# Patient Record
Sex: FEMALE | Race: WHITE | NOT HISPANIC OR LATINO | Employment: OTHER | URBAN - METROPOLITAN AREA
[De-identification: names, ages, dates, MRNs, and addresses within clinical notes are randomized per-mention and may not be internally consistent; named-entity substitution may affect disease eponyms.]

---

## 2017-02-17 DIAGNOSIS — I10 HTN (HYPERTENSION): ICD-10-CM

## 2017-02-17 DIAGNOSIS — E78.5 HYPERLIPIDEMIA LDL GOAL <70: ICD-10-CM

## 2017-02-17 DIAGNOSIS — Z00.00 ROUTINE GENERAL MEDICAL EXAMINATION AT A HEALTH CARE FACILITY: ICD-10-CM

## 2017-02-17 RX ORDER — LISINOPRIL 10 MG/1
10 TABLET ORAL DAILY
Qty: 90 TABLET | Refills: 2 | Status: SHIPPED | OUTPATIENT
Start: 2017-02-17 | End: 2017-11-26

## 2017-02-17 NOTE — TELEPHONE ENCOUNTER
lisinopril (PRINIVIL,ZESTRIL) 10 MG tablet  Last Written Prescription Date:  1/11/16  Last Fill Quantity: 90,   # refills: 1  Last Office Visit with G, P or [facility] prescribing provider: 9/26/16  Future Office visit:   None  Potassium   Date Value Ref Range Status   06/29/2015 3.7 3.4 - 5.3 mmol/L Final   ]  Creatinine   Date Value Ref Range Status   06/29/2015 0.67 0.52 - 1.04 mg/dL Final   ]  BP Readings from Last 3 Encounters:   09/26/16 130/84   01/28/16 109/73   09/24/15 121/72       Routing refill request to provider for review/approval because:   lisinopril (PRINIVIL,ZESTRIL) 10 MG tablet. Labs past due.

## 2017-02-20 ENCOUNTER — TELEPHONE (OUTPATIENT)
Dept: DERMATOLOGY | Facility: CLINIC | Age: 72
End: 2017-02-20

## 2017-02-20 ENCOUNTER — OFFICE VISIT (OUTPATIENT)
Dept: DERMATOLOGY | Facility: CLINIC | Age: 72
End: 2017-02-20

## 2017-02-20 DIAGNOSIS — L71.9 ROSACEA: ICD-10-CM

## 2017-02-20 DIAGNOSIS — B00.9 HERPES VIRUS DISEASE: ICD-10-CM

## 2017-02-20 DIAGNOSIS — L24.9 IRRITANT DERMATITIS: ICD-10-CM

## 2017-02-20 DIAGNOSIS — Z12.83 SKIN CANCER SCREENING: Primary | ICD-10-CM

## 2017-02-20 RX ORDER — TRETINOIN 0.25 MG/G
CREAM TOPICAL
Qty: 45 G | Refills: 11 | Status: SHIPPED | OUTPATIENT
Start: 2017-02-20 | End: 2020-02-06

## 2017-02-20 RX ORDER — DOXYCYCLINE 100 MG/1
CAPSULE ORAL
Qty: 90 CAPSULE | Refills: 3 | Status: SHIPPED | OUTPATIENT
Start: 2017-02-20 | End: 2018-02-23

## 2017-02-20 RX ORDER — VALACYCLOVIR HYDROCHLORIDE 1 G/1
1000 TABLET, FILM COATED ORAL DAILY
Qty: 90 TABLET | Refills: 3 | Status: SHIPPED | OUTPATIENT
Start: 2017-02-20 | End: 2018-01-09

## 2017-02-20 RX ORDER — PIMECROLIMUS 10 MG/G
CREAM TOPICAL 2 TIMES DAILY
Qty: 60 G | Refills: 11 | Status: SHIPPED | OUTPATIENT
Start: 2017-02-20 | End: 2023-01-09

## 2017-02-20 ASSESSMENT — PAIN SCALES - GENERAL: PAINLEVEL: NO PAIN (0)

## 2017-02-20 NOTE — LETTER
2/20/2017       RE: Kassy Casper  [address] [place] [address]     Dear Colleague,    Thank you for referring your patient, Kassy Casper, to the [facility] DERMATOLOGY at [facility]. Please see a copy of my visit note below.    [facility] Dermatology Clinic Note    Dermatology Problem List:  1. Ocular rosacea  2. Acne rosacea s/p bactrim  - tretinoin 0.025% cream, doxycyline, Elidel 1% cream  3. History of irritant dermatitis  - desonide 0.05% ointment, triamcinolone 0.1% ointment  4. History of cold sores  - oral valacyclovir  5. Skin cancer screening 2/20/17    Encounter Date: Feb 20, 2017   Chief Complaint   Patient presents with     Skin Check     Kassy is requesting a full skin check, and needs med refills.     History of Present Illness:   This is a 71 year old female who presents to dermatology clinic for a follow up for a skin check. The patient was last seen on 2/19/16 by Dr. Lugo when she continued tretinoin cream, Elidel and started bactrim for rosacea, started desonide ointment and triamcinolone for irritant dermatitis and was prescribed oral valacyclovir if cold sores appear. Today the patient reports that when she was using her medication regimen, her skin was very good. She currently does not have any prescriptions for the medications. She also reports that she has a history of cold sores and they appear in random spots. For acne rosacea, she was using tretinoin 0.025% cream, Elidel and bactrim. She was switched to bactrim instead of doxycyline due to stomach problems, but her stomach problems did not resolve so it was not correlated. Her dermatitis has resolved and she does not deal with this very often, so she is not using the desonide or triamcinolone ointments. She reports that her eyes are not pruritic or red currently, but later in the day there will be irritation. The patient reports no other lesions of concern  at this time.     Otherwise, the patient reports no painful, bleeding, nonhealing, or pruritic lesions, and denies new or changing moles.    Past Medical History:   Allergies as of    Diagnosis     CARDIOVASCULAR SCREENING; LDL GOAL LESS THAN 100     Insomnia     CAD (coronary artery disease)     Hypertension     Hyperlipidemia with target LDL less than 70     Rosacea     Post-inflammatory hyperpigmentation     Lumbago     Acne     H/O cold sores     Family History:    The patient reports a strong family history of breast cancer. There is a family history of melanoma in two of her cousins and aunt.     Medications:   Current Outpatient Prescriptions   Medication Sig Dispense Refill     lisinopril (PRINIVIL/ZESTRIL) 10 MG tablet Take 1 tablet (10 mg) by mouth daily 90 tablet 2     ALPRAZolam (XANAX) 0.5 MG tablet Take 1 tablet (0.5 mg) by mouth nightly as needed for anxiety 30 tablet 0     ciprofloxacin (CIPRO) 500 MG tablet Take 1 tablet (500 mg) by mouth 2 times daily 10 tablet 0     pravastatin (PRAVACHOL) 80 MG tablet Take 1 tablet (80 mg) by mouth daily 90 tablet 2     traZODone (DESYREL) 100 MG tablet Take 1 tablet (100 mg) by mouth nightly as needed for sleep 90 tablet 3     hydrochlorothiazide (HYDRODIURIL) 50 MG tablet Take 1 tablet (50 mg) by mouth daily 90 tablet 3     valACYclovir (VALTREX) 1000 mg tablet Take 1 tablet (1,000 mg) by mouth daily 90 tablet 3     desonide (DESOWEN) 0.05 % ointment Apply topically 2 times daily To itchy rash and red areas of face until improved. Avoid eyes. 60 g 1     triamcinolone (KENALOG) 0.1 % ointment Apply topically 2 times daily To itchy and red areas of the hands as needed until improved. 60 g 5     clindamycin (CLEOCIN T) 1 % lotion Apply to entire face twice daily until improved. 60 mL 11     sulfamethoxazole-trimethoprim (BACTRIM DS) 800-160 MG per tablet Take 1 tablet by mouth 2 times daily 20 tablet 0     tretinoin (RETIN-A) 0.025 % cream Apply to entire face at  bedtime. Skip to every other night if too irritating. 45 g 11     predniSONE (DELTASONE) 20 MG tablet Take 1 tablet (20 mg) by mouth 2 times daily 20 tablet 1     acyclovir (ZOVIRAX) 5 % ointment Apply topically 5 times daily To affected area at lower lip until resolved. 30 g 11     doxycycline Monohydrate 100 MG CAPS Take 1 tab by mouth twice a day for two weeks. Then take 1 tab daily for 2 weeks until return to clinic. 60 capsule 11     pimecrolimus (ELIDEL) 1 % cream Apply topically 2 times daily To redness on the face 60 g 11     albuterol (PROAIR HFA, PROVENTIL HFA, VENTOLIN HFA) 108 (90 BASE) MCG/ACT inhaler Inhale 2 puffs into the lungs every 6 hours 1 Inhaler 3     VITAMIN D, CHOLECALCIFEROL, PO        fluticasone (FLONASE) 50 MCG/ACT nasal spray Spray 2 sprays into both nostrils daily 1 Package 3     loratadine (CLARITIN) 10 MG tablet Take 1 tablet (10 mg) by mouth daily as needed for allergies (for hives) 30 tablet 6     EPINEPHrine (EPIPEN) 0.3 MG/0.3ML injection Inject 0.3 mLs (0.3 mg) into the muscle once as needed for anaphylaxis 2 each 3     Multiple Vitamin (MULTIVITAMIN  S) CAPS Take 1 tablet by mouth daily.       aspirin 81 MG tablet Take 1 tablet by mouth daily.       Allergies:  Allergies as of 02/20/2017 - Dhaval as Reviewed 02/20/2017   Allergen Reaction Noted     Latex Other (See Comments) 12/17/2007     Minocycline hcl Swelling 10/07/2013     Penicillin [penicillins] Other (See Comments) 12/17/2007      Review of Systems:   Patient reports otherwise feeling well and denies any other skin concerns.    Physical exam:   Vital signs: There were no vitals taken for this visit.  GEN: Well appearing female in no acute distress alert and oriented to time, person, place, and situation.  SKIN: Total skin excluding the undergarment areas was performed. The exam included the head/face, neck, both arms, chest, back, abdomen, both legs, digits and/or nails.   - Few regular brown pigmented macules and papules  are identified on the trunk and extremities.   - There are bright red some shaped papules scattered on the trunk.   - Faint swelling of the eyelids with erythema noted to the lower conjunctiva.   - Scattered excoriated papules to her face.   - No other lesions of concern on areas examined.    Assessment and Plan:   1. Ocular rosacea  - Discussion of eye drops and the patient will talk to her ophthalmologist.   - Discussion that the doxycyline could help with this.     2. Acne rosacea  - Continue tretinoin 0.025% ointment - apply to entire face daily at bedtime. If too irritating or drying, decrease to every other night  - Start doxycyline 100 mg daily  - Continue pimecrolimus (elidel) 1% cream - apply topically bid to redness on the face    3. History of cold sores.   - Continue valacyclovir 1,000 mg twice daily by mouth for two weeks, then stop down to 1,000 mg daily by mouth for maintenance.    4. Few clinically benign nevi - trunk , extremities  - Benign nature was discussed. No further intervention required at this time.   - Monitor lesions for symptoms or change.     5. Cherry angioma(s) - trunk  - Benign nature was discussed. No further intervention required at this time.     Follow up in 1 year, earlier for new or changing lesions. .     Staff involved:  Scribe Disclosure:   I, Isabel Narayan, am serving as a scribe to document services personally performed by Brigette Portillo, based on data collection and the provider's statements to me.    Provider Disclosure:   The documentation recorded by the scribe accurately reflects the services I personally performed and the decisions made by me.    All risks, benefits and alternatives were discussed with patient.  Patient is in agreement and understands the assessment and plan.  All questions were answered.  Sun Screen Education was given.   Return to Clinic annually or sooner as needed.   Brigette Portillo PA-C   [facility] Dermatology [facility]

## 2017-02-20 NOTE — PROGRESS NOTES
[facility] Dermatology Clinic Note    Dermatology Problem List:  1. Ocular rosacea  2. Acne rosacea s/p bactrim  - tretinoin 0.025% cream, doxycyline, Elidel 1% cream  3. History of irritant dermatitis  - desonide 0.05% ointment, triamcinolone 0.1% ointment  4. History of cold sores  - oral valacyclovir  5. Skin cancer screening 2/20/17    Encounter Date: Feb 20, 2017   Chief Complaint   Patient presents with     Skin Check     Kassy is requesting a full skin check, and needs med refills.     History of Present Illness:   This is a 71 year old female who presents to dermatology clinic for a follow up for a skin check. The patient was last seen on 2/19/16 by Dr. Lugo when she continued tretinoin cream, Elidel and started bactrim for rosacea, started desonide ointment and triamcinolone for irritant dermatitis and was prescribed oral valacyclovir if cold sores appear. Today the patient reports that when she was using her medication regimen, her skin was very good. She currently does not have any prescriptions for the medications. She also reports that she has a history of cold sores and they appear in random spots. For acne rosacea, she was using tretinoin 0.025% cream, Elidel and bactrim. She was switched to bactrim instead of doxycyline due to stomach problems, but her stomach problems did not resolve so it was not correlated. Her dermatitis has resolved and she does not deal with this very often, so she is not using the desonide or triamcinolone ointments. She reports that her eyes are not pruritic or red currently, but later in the day there will be irritation. The patient reports no other lesions of concern at this time.     Otherwise, the patient reports no painful, bleeding, nonhealing, or pruritic lesions, and denies new or changing moles.    Past Medical History:   Allergies as of    Diagnosis     CARDIOVASCULAR SCREENING; LDL GOAL LESS THAN 100     Insomnia     CAD (coronary artery  disease)     Hypertension     Hyperlipidemia with target LDL less than 70     Rosacea     Post-inflammatory hyperpigmentation     Lumbago     Acne     H/O cold sores     Family History:    The patient reports a strong family history of breast cancer. There is a family history of melanoma in two of her cousins and aunt.     Medications:   Current Outpatient Prescriptions   Medication Sig Dispense Refill     lisinopril (PRINIVIL/ZESTRIL) 10 MG tablet Take 1 tablet (10 mg) by mouth daily 90 tablet 2     ALPRAZolam (XANAX) 0.5 MG tablet Take 1 tablet (0.5 mg) by mouth nightly as needed for anxiety 30 tablet 0     ciprofloxacin (CIPRO) 500 MG tablet Take 1 tablet (500 mg) by mouth 2 times daily 10 tablet 0     pravastatin (PRAVACHOL) 80 MG tablet Take 1 tablet (80 mg) by mouth daily 90 tablet 2     traZODone (DESYREL) 100 MG tablet Take 1 tablet (100 mg) by mouth nightly as needed for sleep 90 tablet 3     hydrochlorothiazide (HYDRODIURIL) 50 MG tablet Take 1 tablet (50 mg) by mouth daily 90 tablet 3     valACYclovir (VALTREX) 1000 mg tablet Take 1 tablet (1,000 mg) by mouth daily 90 tablet 3     desonide (DESOWEN) 0.05 % ointment Apply topically 2 times daily To itchy rash and red areas of face until improved. Avoid eyes. 60 g 1     triamcinolone (KENALOG) 0.1 % ointment Apply topically 2 times daily To itchy and red areas of the hands as needed until improved. 60 g 5     clindamycin (CLEOCIN T) 1 % lotion Apply to entire face twice daily until improved. 60 mL 11     sulfamethoxazole-trimethoprim (BACTRIM DS) 800-160 MG per tablet Take 1 tablet by mouth 2 times daily 20 tablet 0     tretinoin (RETIN-A) 0.025 % cream Apply to entire face at bedtime. Skip to every other night if too irritating. 45 g 11     predniSONE (DELTASONE) 20 MG tablet Take 1 tablet (20 mg) by mouth 2 times daily 20 tablet 1     acyclovir (ZOVIRAX) 5 % ointment Apply topically 5 times daily To affected area at lower lip until resolved. 30 g 11      doxycycline Monohydrate 100 MG CAPS Take 1 tab by mouth twice a day for two weeks. Then take 1 tab daily for 2 weeks until return to clinic. 60 capsule 11     pimecrolimus (ELIDEL) 1 % cream Apply topically 2 times daily To redness on the face 60 g 11     albuterol (PROAIR HFA, PROVENTIL HFA, VENTOLIN HFA) 108 (90 BASE) MCG/ACT inhaler Inhale 2 puffs into the lungs every 6 hours 1 Inhaler 3     VITAMIN D, CHOLECALCIFEROL, PO        fluticasone (FLONASE) 50 MCG/ACT nasal spray Spray 2 sprays into both nostrils daily 1 Package 3     loratadine (CLARITIN) 10 MG tablet Take 1 tablet (10 mg) by mouth daily as needed for allergies (for hives) 30 tablet 6     EPINEPHrine (EPIPEN) 0.3 MG/0.3ML injection Inject 0.3 mLs (0.3 mg) into the muscle once as needed for anaphylaxis 2 each 3     Multiple Vitamin (MULTIVITAMIN  S) CAPS Take 1 tablet by mouth daily.       aspirin 81 MG tablet Take 1 tablet by mouth daily.       Allergies:  Allergies as of 02/20/2017 - Dhaval as Reviewed 02/20/2017   Allergen Reaction Noted     Latex Other (See Comments) 12/17/2007     Minocycline hcl Swelling 10/07/2013     Penicillin [penicillins] Other (See Comments) 12/17/2007      Review of Systems:   Patient reports otherwise feeling well and denies any other skin concerns.    Physical exam:   Vital signs: There were no vitals taken for this visit.  GEN: Well appearing female in no acute distress alert and oriented to time, person, place, and situation.  SKIN: Total skin excluding the undergarment areas was performed. The exam included the head/face, neck, both arms, chest, back, abdomen, both legs, digits and/or nails.   - Few regular brown pigmented macules and papules are identified on the trunk and extremities.   - There are bright red some shaped papules scattered on the trunk.   - Faint swelling of the eyelids with erythema noted to the lower conjunctiva.   - Scattered excoriated papules to her face.   - No other lesions of concern on areas  examined.    Assessment and Plan:   1. Ocular rosacea  - Discussion of eye drops and the patient will talk to her ophthalmologist.   - Discussion that the doxycyline could help with this.     2. Acne rosacea  - Continue tretinoin 0.025% ointment - apply to entire face daily at bedtime. If too irritating or drying, decrease to every other night  - Start doxycyline 100 mg daily  - Continue pimecrolimus (elidel) 1% cream - apply topically bid to redness on the face    3. History of cold sores.   - Continue valacyclovir 1,000 mg twice daily by mouth for two weeks, then stop down to 1,000 mg daily by mouth for maintenance.    4. Few clinically benign nevi - trunk , extremities  - Benign nature was discussed. No further intervention required at this time.   - Monitor lesions for symptoms or change.     5. Cherry angioma(s) - trunk  - Benign nature was discussed. No further intervention required at this time.     Follow up in 1 year, earlier for new or changing lesions. .     Staff involved:  Scribe Disclosure:   I, Isabel Narayan, am serving as a scribe to document services personally performed by Brigette Portillo, based on data collection and the provider's statements to me.    Provider Disclosure:   The documentation recorded by the scribe accurately reflects the services I personally performed and the decisions made by me.    All risks, benefits and alternatives were discussed with patient.  Patient is in agreement and understands the assessment and plan.  All questions were answered.  Sun Screen Education was given.   Return to Clinic annually or sooner as needed.   Brigette Portillo PA-C   [facility]

## 2017-02-20 NOTE — NURSING NOTE
Dermatology Rooming Note    Kassy Casper's goals for this visit include:   Chief Complaint   Patient presents with     Skin Check     Kassy is requesting a full skin check, and needs med refills.       Melissa Mcgill LPN

## 2017-02-20 NOTE — MR AVS SNAPSHOT
After Visit Summary   2/20/2017    Kassy Casper    MRN: [id number]           Patient Information     Date Of Birth          1945        Visit Information        Provider Department      2/20/2017 11:00 AM Brigette Portillo PA-C [facility] Dermatology        Today's Diagnoses     Skin cancer screening    -  1    Herpes virus disease        Irritant dermatitis        Rosacea           Follow-ups after your visit        Follow-up notes from your care team     Return in about 1 year (around 2/20/2018).      Who to contact     Please call your clinic at [phone] to:    Ask questions about your health    Make or cancel appointments    Discuss your medicines    Learn about your test results    Speak to your doctor   If you have compliments or concerns about an experience at your clinic, or if you wish to file a complaint, please contact [facility] Patient Relations at [phone] or email us at [email or web address]         Additional Information About Your Visit        MyChart Information     [employer] gives you secure access to your electronic health record. If you see a primary care provider, you can also send messages to your care team and make appointments. If you have questions, please call your primary care clinic.  If you do not have a primary care provider, please call [phone] and they will assist you.      [employer] is an electronic gateway that provides easy, online access to your medical records. With [employer], you can request a clinic appointment, read your test results, renew a prescription or communicate with your care team.     To access your existing account, please contact your [facility] or call [phone] for assistance.        Care EveryWhere ID     This is your Care EveryWhere ID. This could be used by other organizations to access your [place] medical records  [id number]         Blood Pressure from  Last 3 Encounters:   09/26/16 130/84   01/28/16 109/73   09/24/15 121/72    Weight from Last 3 Encounters:   09/26/16 61.2 kg (135 lb)   01/28/16 60.2 kg (132 lb 12.8 oz)   09/24/15 58.4 kg (128 lb 11.2 oz)              Today, you had the following     No orders found for display         Today's Medication Changes          These changes are accurate as of: 2/20/17  7:00 PM.  If you have any questions, ask your nurse or doctor.               These medicines have changed or have updated prescriptions.        Dose/Directions    doxycycline Monohydrate 100 MG Caps   This may have changed:  additional instructions   Used for:  Rosacea   Changed by:  Brigette Portillo PA-C        Take 100 mg daily. Take with a full glass of water. Avoid taking immediately before bed.   Quantity:  90 capsule   Refills:  3            Where to get your medicines      These medications were sent to [place] [address] [place] [address] [address]     Phone:  [phone]     doxycycline Monohydrate 100 MG Caps    pimecrolimus 1 % cream    tretinoin 0.025 % cream    valACYclovir 1000 mg tablet                Primary Care Provider Office Phone # Fax #    Krunal Oconnell [phone]       [facility] [address] [place] [address]        Thank you!     Thank you for choosing [facility] DERMATOLOGY  for your care. Our goal is always to provide you with excellent care. Hearing back from our patients is one way we can continue to improve our services. Please take a few minutes to complete the written survey that you may receive in the mail after your visit with us. Thank you!             Your Updated Medication List - Protect others around you: Learn how to safely use, store and throw away your medicines at www.disposemymeds.org.          This list is accurate as of: 2/20/17  7:00 PM.  Always use your most recent med list.                   Brand  Name Dispense Instructions for use    acyclovir 5 % ointment    ZOVIRAX    30 g    Apply topically 5 times daily To affected area at lower lip until resolved.       albuterol 108 (90 BASE) MCG/ACT Inhaler    PROAIR HFA/PROVENTIL HFA/VENTOLIN HFA    1 Inhaler    Inhale 2 puffs into the lungs every 6 hours       ALPRAZolam 0.5 MG tablet    XANAX    30 tablet    Take 1 tablet (0.5 mg) by mouth nightly as needed for anxiety       aspirin 81 MG tablet      Take 1 tablet by mouth daily.       ciprofloxacin 500 MG tablet    CIPRO    10 tablet    Take 1 tablet (500 mg) by mouth 2 times daily       clindamycin 1 % lotion    CLEOCIN T    60 mL    Apply to entire face twice daily until improved.       desonide 0.05 % ointment    DESOWEN    60 g    Apply topically 2 times daily To itchy rash and red areas of face until improved. Avoid eyes.       doxycycline Monohydrate 100 MG Caps     90 capsule    Take 100 mg daily. Take with a full glass of water. Avoid taking immediately before bed.       EPINEPHrine 0.3 MG/0.3ML injection    EPIPEN    2 each    Inject 0.3 mLs (0.3 mg) into the muscle once as needed for anaphylaxis       fluticasone 50 MCG/ACT spray    FLONASE    1 Package    Spray 2 sprays into both nostrils daily       hydrochlorothiazide 50 MG tablet    HYDRODIURIL    90 tablet    Take 1 tablet (50 mg) by mouth daily       lisinopril 10 MG tablet    PRINIVIL/ZESTRIL    90 tablet    Take 1 tablet (10 mg) by mouth daily       loratadine 10 MG tablet    CLARITIN    30 tablet    Take 1 tablet (10 mg) by mouth daily as needed for allergies (for hives)       MULTIvitamin  S Caps      Take 1 tablet by mouth daily.       pimecrolimus 1 % cream    ELIDEL    60 g    Apply topically 2 times daily To redness on the face       pravastatin 80 MG tablet    PRAVACHOL    90 tablet    Take 1 tablet (80 mg) by mouth daily       predniSONE 20 MG tablet    DELTASONE    20 tablet    Take 1 tablet (20 mg) by mouth 2 times daily        sulfamethoxazole-trimethoprim 800-160 MG per tablet    BACTRIM DS    20 tablet    Take 1 tablet by mouth 2 times daily       traZODone 100 MG tablet    DESYREL    90 tablet    Take 1 tablet (100 mg) by mouth nightly as needed for sleep       tretinoin 0.025 % cream    RETIN-A    45 g    Apply to entire face at bedtime. Skip to every other night if too irritating.       triamcinolone 0.1 % ointment    KENALOG    60 g    Apply topically 2 times daily To itchy and red areas of the hands as needed until improved.       valACYclovir 1000 mg tablet    VALTREX    90 tablet    Take 1 tablet (1,000 mg) by mouth daily       VITAMIN D (CHOLECALCIFEROL) PO

## 2017-03-28 DIAGNOSIS — R30.0 DYSURIA: ICD-10-CM

## 2017-03-28 RX ORDER — CIPROFLOXACIN 500 MG/1
500 TABLET, FILM COATED ORAL 2 TIMES DAILY
Qty: 10 TABLET | Refills: 0 | Status: SHIPPED | OUTPATIENT
Start: 2017-03-28 | End: 2020-02-07

## 2017-03-28 NOTE — TELEPHONE ENCOUNTER
ciprofloxacin (CIPRO) 500 MG tablet    Last Written Prescription Date:  12/19/16  Last Fill Quantity: 10,   # refills: 0  Last Office Visit with G, P or [facility] prescribing provider: 9/26/16  Future Office visit:   none    Routing refill request to provider for review/approval because:   ciprofloxacin (CIPRO) 500 MG tablet. Not on [facility] protocol. rf?

## 2017-04-20 DIAGNOSIS — F41.9 ANXIETY: ICD-10-CM

## 2017-04-20 NOTE — TELEPHONE ENCOUNTER
Alprazolam 0.5 mg tabs      Last Written Prescription Date:  1-19-17  Last Fill Quantity: 30,   # refills: 0  Last Office Visit : 9-26-16  Future Office visit:  none    Routing refill request to provider for review/approval because:  Drug not on the [facility], [facility] or [facility] refill protocol or controlled substance.      Kathleen M Doege RN

## 2017-04-25 RX ORDER — ALPRAZOLAM 0.5 MG
0.5 TABLET ORAL
Qty: 30 TABLET | Refills: 0
Start: 2017-04-25 | End: 2017-06-29

## 2017-06-29 DIAGNOSIS — F41.9 ANXIETY: ICD-10-CM

## 2017-06-29 RX ORDER — ALPRAZOLAM 0.5 MG
0.5 TABLET ORAL
Qty: 30 TABLET | Refills: 0
Start: 2017-06-29 | End: 2017-09-07

## 2017-08-15 DIAGNOSIS — E78.5 HYPERLIPIDEMIA LDL GOAL <70: ICD-10-CM

## 2017-08-15 DIAGNOSIS — I10 ESSENTIAL HYPERTENSION: ICD-10-CM

## 2017-08-15 DIAGNOSIS — Z00.00 ROUTINE GENERAL MEDICAL EXAMINATION AT A HEALTH CARE FACILITY: ICD-10-CM

## 2017-08-16 RX ORDER — PRAVASTATIN SODIUM 80 MG/1
80 TABLET ORAL DAILY
Qty: 90 TABLET | Refills: 0 | Status: SHIPPED | OUTPATIENT
Start: 2017-08-16 | End: 2017-11-26

## 2017-08-16 NOTE — TELEPHONE ENCOUNTER
pravastatin (PRAVACHOL) 80 MG tablet      Last Written Prescription Date:  11-23-16  Last Fill Quantity: 90,   # refills: 2  Last Office Visit : 9-26-16  Future Office visit:  none    Kathleen M Doege RN

## 2017-09-07 DIAGNOSIS — F41.9 ANXIETY: ICD-10-CM

## 2017-09-07 RX ORDER — ALPRAZOLAM 0.5 MG
0.5 TABLET ORAL
Qty: 30 TABLET | Refills: 0
Start: 2017-09-07 | End: 2018-02-01

## 2017-10-22 ENCOUNTER — HEALTH MAINTENANCE LETTER (OUTPATIENT)
Age: 72
End: 2017-10-22

## 2017-11-26 DIAGNOSIS — Z00.00 ROUTINE GENERAL MEDICAL EXAMINATION AT A HEALTH CARE FACILITY: ICD-10-CM

## 2017-11-26 DIAGNOSIS — I10 ESSENTIAL HYPERTENSION: ICD-10-CM

## 2017-11-26 DIAGNOSIS — E78.5 HYPERLIPIDEMIA LDL GOAL <70: ICD-10-CM

## 2017-11-26 DIAGNOSIS — I10 HTN (HYPERTENSION): ICD-10-CM

## 2017-11-26 NOTE — LETTER
November 29, 2017      TO: Kassy Casper  [address] [place] [address]     Dear Ms. Kassy Casper,    This letter is a reminder that you are overdue to see your Primary Care Provider for an Annual Visit and needed labs. You must be seen by your Primary Care Provider on a yearly basis and have appropriate labs drawn for continued care and prescription refills. Please call [phone] to schedule an appointment for an Annual Visit with RADHA CORNELIUS MD.   LAST VISIT  9/26/16    You have been given a 30 day supply/refill of your pravastatin (PRAVACHOL) 80 MG &  lisinopril (PRINIVIL/ZESTRIL) 10 MG while you get your clinic visit/labs completed.     [facility],  Primary Care Center

## 2017-11-29 ENCOUNTER — OFFICE VISIT (OUTPATIENT)
Dept: OPHTHALMOLOGY | Facility: CLINIC | Age: 72
End: 2017-11-29
Attending: OPHTHALMOLOGY
Payer: MEDICARE

## 2017-11-29 DIAGNOSIS — H25.813 COMBINED FORMS OF AGE-RELATED CATARACT OF BOTH EYES: Primary | ICD-10-CM

## 2017-11-29 DIAGNOSIS — H01.119 CONTACT AND ALLERGIC DERMATITIS OF EYELID: ICD-10-CM

## 2017-11-29 DIAGNOSIS — D31.32 NEVUS OF CHOROID OF LEFT EYE: ICD-10-CM

## 2017-11-29 DIAGNOSIS — H52.4 PRESBYOPIA: ICD-10-CM

## 2017-11-29 PROCEDURE — 99213 OFFICE O/P EST LOW 20 MIN: CPT | Mod: ZF

## 2017-11-29 PROCEDURE — 92015 DETERMINE REFRACTIVE STATE: CPT | Mod: GY,ZF

## 2017-11-29 RX ORDER — LISINOPRIL 10 MG/1
10 TABLET ORAL DAILY
Qty: 30 TABLET | Refills: 0 | Status: SHIPPED | OUTPATIENT
Start: 2017-11-29 | End: 2017-12-31

## 2017-11-29 RX ORDER — PRAVASTATIN SODIUM 80 MG/1
80 TABLET ORAL DAILY
Qty: 30 TABLET | Refills: 0 | Status: SHIPPED | OUTPATIENT
Start: 2017-11-29 | End: 2017-12-31

## 2017-11-29 RX ORDER — NEOMYCIN SULFATE, POLYMYXIN B SULFATE, AND DEXAMETHASONE 3.5; 10000; 1 MG/G; [USP'U]/G; MG/G
1 OINTMENT OPHTHALMIC 2 TIMES DAILY
Qty: 1 TUBE | Refills: 1 | Status: SHIPPED | OUTPATIENT
Start: 2017-11-29 | End: 2020-02-07

## 2017-11-29 ASSESSMENT — TONOMETRY
OS_IOP_MMHG: 17
IOP_METHOD: TONOPEN
OD_IOP_MMHG: 16

## 2017-11-29 ASSESSMENT — SLIT LAMP EXAM - LIDS: COMMENTS: NORMAL

## 2017-11-29 ASSESSMENT — CUP TO DISC RATIO
OS_RATIO: 0.3
OD_RATIO: 0.3

## 2017-11-29 ASSESSMENT — REFRACTION_MANIFEST
OD_ADD: +2.00
OD_CYLINDER: +1.00
OD_SPHERE: -0.25
OS_ADD: +2.00
OS_CYLINDER: +0.50
OS_SPHERE: -1.00
OD_AXIS: 165
OS_AXIS: 010

## 2017-11-29 ASSESSMENT — VISUAL ACUITY
METHOD: SNELLEN - LINEAR
OS_SC: 20/25
OD_SC: 20/20

## 2017-11-29 ASSESSMENT — EXTERNAL EXAM - LEFT EYE: OS_EXAM: NORMAL

## 2017-11-29 ASSESSMENT — CONF VISUAL FIELD
OS_NORMAL: 1
OD_NORMAL: 1

## 2017-11-29 ASSESSMENT — EXTERNAL EXAM - RIGHT EYE: OD_EXAM: ROSACEA

## 2017-11-29 NOTE — PROGRESS NOTES
HPI  Kassy Casper is a 71 year old female presenting for comprehensive eye exam. Reports that for the past 2.5 months she has had redness and itchiness underneath the right eye lid. Reports that it changes color.    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 Related to her history of allergy/asthma, possible eczema  Plan: Try topical maxitrol to lower eyelid. Recommend she follow-up in dermatology clinic.     (H52.4) Presbyopia  Comment: Refracted and corrects to 20/20  Plan: Ok to continue with OTC readers     -----------------------------------------------------------------------------------    Patient disposition:   Return in about 1 year (around 11/29/2018). or sooner as needed.    Mikel Juarez MD  PGY-2 Ophthalmology  [phone]    Teaching statement:  Complete documentation of historical and exam elements from today's encounter can be found in the full encounter summary report (not reduplicated in this progress note). I personally obtained the chief complaint(s) and history of present illness.  I confirmed and edited as necessary the review of systems, past medical/surgical history, family history, social history, and examination findings as documented by others; and I examined the patient myself. I personally reviewed the relevant tests, images, and reports as documented above.     I formulated and edited as necessary the assessment and plan and discussed the findings and management plan with the patient and family.    Lynette Castaneda MD  Comprehensive Ophthalmology & Ocular Pathology  Department of  Ophthalmology and Visual Neurosciences  [email or web address]  Pager [phone]

## 2017-11-29 NOTE — MR AVS SNAPSHOT
After Visit Summary   11/29/2017    Kassy Casper    MRN: [id number]           Patient Information     Date Of Birth          1945        Visit Information        Provider Department      11/29/2017 7:45 AM Lynette Castaneda MD [facility]        Today's Diagnoses     Combined forms of age-related cataract of both eyes    -  1    Nevus of choroid of left eye        Contact and allergic dermatitis of eyelid        Presbyopia           Follow-ups after your visit        Follow-up notes from your care team     Return in about 1 year (around 11/29/2018).      Who to contact     Please call your clinic at [phone] to:    Ask questions about your health    Make or cancel appointments    Discuss your medicines    Learn about your test results    Speak to your doctor   If you have compliments or concerns about an experience at your clinic, or if you wish to file a complaint, please contact [facility] Patient Relations at [phone] or email us at [email or web address]         Additional Information About Your Visit        MyChart Information     [employer] gives you secure access to your electronic health record. If you see a primary care provider, you can also send messages to your care team and make appointments. If you have questions, please call your primary care clinic.  If you do not have a primary care provider, please call [phone] and they will assist you.      [employer] is an electronic gateway that provides easy, online access to your medical records. With [employer], you can request a clinic appointment, read your test results, renew a prescription or communicate with your care team.     To access your existing account, please contact your [facility] or call [phone] for assistance.        Care EveryWhere ID     This is your Care EveryWhere ID. This could be used by other organizations to access your [facility]  records  [id number]         Blood Pressure from Last 3 Encounters:   09/26/16 130/84   01/28/16 109/73   09/24/15 121/72    Weight from Last 3 Encounters:   09/26/16 61.2 kg (135 lb)   01/28/16 60.2 kg (132 lb 12.8 oz)   09/24/15 58.4 kg (128 lb 11.2 oz)              Today, you had the following     No orders found for display         Today's Medication Changes          These changes are accurate as of: 11/29/17  9:40 AM.  If you have any questions, ask your nurse or doctor.               Start taking these medicines.        Dose/Directions    neomycin-polymyxin-dexamethasone 3.5-49521-0.1 Oint ophthalmic ointment   Commonly known as:  MAXITROL   Used for:  Contact and allergic dermatitis of eyelid   Started by:  Lynette Castaneda MD        Dose:  1 Application   Place 1 Application into the right eye 2 times daily   Quantity:  1 Tube   Refills:  1            Where to get your medicines      These medications were sent to [place] [address] [place] [address]     Phone:  [phone]     neomycin-polymyxin-dexamethasone 3.5-31134-5.1 Oint ophthalmic ointment                Primary Care Provider Office Phone # Fax #    Krunal Oconnell [phone]       0 [address] [address]        Equal Access to Services     [facility] AH: Hadii james Graham, waaxda lujuanaadaha, qaybta kaalmada sarah, waxvarinder yonny mcneil. So [facility] [phone].    ATENCIÓN: Si habla español, tiene a hale disposición servicios gratuitos de asistencia lingüística. Javier jolly [phone].    We comply with applicable federal civil rights laws and [place] laws. We do not discriminate on the basis of race, color, national origin, age, disability, sex, sexual orientation, or gender identity.            Thank you!     Thank you for choosing [facility]  for your care. Our goal is always to provide you with excellent  care. Hearing back from our patients is one way we can continue to improve our services. Please take a few minutes to complete the written survey that you may receive in the mail after your visit with us. Thank you!             Your Updated Medication List - Protect others around you: Learn how to safely use, store and throw away your medicines at www.disposemymeds.org.          This list is accurate as of: 11/29/17  9:40 AM.  Always use your most recent med list.                   Brand Name Dispense Instructions for use Diagnosis    acyclovir 5 % ointment    ZOVIRAX    30 g    Apply topically 5 times daily To affected area at lower lip until resolved.    Cold sore       albuterol 108 (90 BASE) MCG/ACT Inhaler    PROAIR HFA/PROVENTIL HFA/VENTOLIN HFA    1 Inhaler    Inhale 2 puffs into the lungs every 6 hours    Intermittent asthma       ALPRAZolam 0.5 MG tablet    XANAX    30 tablet    Take 1 tablet (0.5 mg) by mouth nightly as needed for anxiety . Patient needs to see primary provider for further refills.    Anxiety       aspirin 81 MG tablet      Take 1 tablet by mouth daily.        ciprofloxacin 500 MG tablet    CIPRO    10 tablet    Take 1 tablet (500 mg) by mouth 2 times daily    Dysuria       clindamycin 1 % lotion    CLEOCIN T    60 mL    Apply to entire face twice daily until improved.    Irritant dermatitis       desonide 0.05 % ointment    DESOWEN    60 g    Apply topically 2 times daily To itchy rash and red areas of face until improved. Avoid eyes.    Irritant dermatitis       doxycycline monohydrate 100 MG capsule     90 capsule    Take 100 mg daily. Take with a full glass of water. Avoid taking immediately before bed.    Rosacea       EPINEPHrine 0.3 MG/0.3ML injection    EPIPEN    2 each    Inject 0.3 mLs (0.3 mg) into the muscle once as needed for anaphylaxis    Urticaria, unspecified       fluticasone 50 MCG/ACT spray    FLONASE    1 Package    Spray 2 sprays into both nostrils daily    Acute  sinusitis with symptoms > 10 days       hydrochlorothiazide 50 MG tablet    HYDRODIURIL    90 tablet    Take 1 tablet (50 mg) by mouth daily    Essential hypertension with goal blood pressure less than 140/90, Hyperlipidemia LDL goal <70, Routine general medical examination at a health care facility       lisinopril 10 MG tablet    PRINIVIL/ZESTRIL    90 tablet    Take 1 tablet (10 mg) by mouth daily    HTN (hypertension), Hyperlipidemia LDL goal <70, Routine general medical examination at a health care facility       loratadine 10 MG tablet    CLARITIN    30 tablet    Take 1 tablet (10 mg) by mouth daily as needed for allergies (for hives)    Urticaria, unspecified       MULTIvitamin  S Caps      Take 1 tablet by mouth daily.        neomycin-polymyxin-dexamethasone 3.5-72857-9.1 Oint ophthalmic ointment    MAXITROL    1 Tube    Place 1 Application into the right eye 2 times daily    Contact and allergic dermatitis of eyelid       pimecrolimus 1 % cream    ELIDEL    60 g    Apply topically 2 times daily To redness on the face    Rosacea       pravastatin 80 MG tablet    PRAVACHOL    90 tablet    Take 1 tablet (80 mg) by mouth daily    Hyperlipidemia LDL goal <70, Essential hypertension, Routine general medical examination at a health care facility       predniSONE 20 MG tablet    DELTASONE    20 tablet    Take 1 tablet (20 mg) by mouth 2 times daily    Urticaria, unspecified       sulfamethoxazole-trimethoprim 800-160 MG per tablet    BACTRIM DS    20 tablet    Take 1 tablet by mouth 2 times daily    Irritant dermatitis       traZODone 100 MG tablet    DESYREL    90 tablet    Take 1 tablet (100 mg) by mouth nightly as needed for sleep    Other insomnia       tretinoin 0.025 % cream    RETIN-A    45 g    Apply to entire face at bedtime. Skip to every other night if too irritating.    Rosacea       triamcinolone 0.1 % ointment    KENALOG    60 g    Apply topically 2 times daily To itchy and red areas of the hands as  needed until improved.    Irritant dermatitis       valACYclovir 1000 mg tablet    VALTREX    90 tablet    Take 1 tablet (1,000 mg) by mouth daily    Herpes virus disease       VITAMIN D (CHOLECALCIFEROL) PO

## 2017-11-29 NOTE — TELEPHONE ENCOUNTER
pravastatin       Last Written Prescription Date:  8/16/17  Last Fill Quantity: 90,   # refills: 0  Last Office Visit : 9/26/16  Future Office visit:  NONE    lisinopril    Last Written Prescription Date:  2/17/17  Last Fill Quantity: 90,   # refills: 2       Routing refill request for review/approval because: OVER DUE MD APPT.  LETTER + 30 DAY RF

## 2017-11-29 NOTE — NURSING NOTE
Chief Complaints and History of Present Illnesses   Patient presents with     COMPREHENSIVE EYE EXAM     HPI    Affected eye(s):  Both   Symptoms:     No blurred vision   No decreased vision   No distorted vision   No floaters   No flashes      Frequency:  Constant       Do you have eye pain now?:  No      Comments:  Pt stated vision has been stable over the last 20 months OU.  Mik Licona  8:14 AM November 29, 2017

## 2017-12-11 DIAGNOSIS — I10 ESSENTIAL HYPERTENSION WITH GOAL BLOOD PRESSURE LESS THAN 140/90: ICD-10-CM

## 2017-12-11 DIAGNOSIS — E78.5 HYPERLIPIDEMIA LDL GOAL <70: ICD-10-CM

## 2017-12-11 DIAGNOSIS — Z00.00 ROUTINE GENERAL MEDICAL EXAMINATION AT A HEALTH CARE FACILITY: ICD-10-CM

## 2017-12-11 NOTE — LETTER
Kassy Casper  [address] [place] [address]        December 13, 2017      This letter is a reminder that you are overdue to see your Primary Care Provider for an Annual Visit and needed labs. You must be seen by your Primary Care Provider on a yearly basis and have appropriate labs drawn for continued care and prescription refills. Please call [phone] to schedule an appointment for an Annual Visit with Dr Krunal Chauhan MD.       You have been given a 30 day supply/refill of your HCTZ while you get your clinic visit/labs completed.      [facility],    Primary Care Center

## 2017-12-13 RX ORDER — HYDROCHLOROTHIAZIDE 50 MG/1
50 TABLET ORAL DAILY
Qty: 30 TABLET | Refills: 0 | Status: SHIPPED | OUTPATIENT
Start: 2017-12-13 | End: 2018-01-19

## 2017-12-13 NOTE — TELEPHONE ENCOUNTER
HCTZ 50 mg tabs      Last Written Prescription Date:  9-26-16  Last Fill Quantity: 90,   # refills: 3  Last Office Visit : 9-26-16  Future Office visit:  none    Will refill for 30 day dany and send My Chart message informing pt she needs to make an appointment - she has been sent a letter in the mail already.      Kathleen M Doege RN

## 2017-12-31 DIAGNOSIS — I10 HTN (HYPERTENSION): ICD-10-CM

## 2017-12-31 DIAGNOSIS — Z00.00 ROUTINE GENERAL MEDICAL EXAMINATION AT A HEALTH CARE FACILITY: ICD-10-CM

## 2017-12-31 DIAGNOSIS — E78.5 HYPERLIPIDEMIA LDL GOAL <70: ICD-10-CM

## 2017-12-31 DIAGNOSIS — I10 ESSENTIAL HYPERTENSION: ICD-10-CM

## 2018-01-03 NOTE — TELEPHONE ENCOUNTER
lisinopril    Last Written Prescription Date: 11/29/17  Last Fill Quantity: 30, # refills: 0  pravastatin      Last Written Prescription Date: 11/29/17  Last Fill Quantity: 30, # refills: 0  Last Office Visit with [facility], P or [facility] prescribing provider: 9/26/16       Potassium   Date Value Ref Range Status   06/29/2015 3.7 3.4 - 5.3 mmol/L Final     Creatinine   Date Value Ref Range Status   06/29/2015 0.67 0.52 - 1.04 mg/dL Final     BP Readings from Last 3 Encounters:   09/26/16 130/84   01/28/16 109/73   09/24/15 121/72      routed to clinic RN, appt and labs overdue, scheduling letter sent in December

## 2018-01-04 ENCOUNTER — MYC MEDICAL ADVICE (OUTPATIENT)
Dept: INTERNAL MEDICINE | Facility: CLINIC | Age: 73
End: 2018-01-04

## 2018-01-04 RX ORDER — LISINOPRIL 10 MG/1
10 TABLET ORAL DAILY
Qty: 15 TABLET | Refills: 0 | Status: SHIPPED | OUTPATIENT
Start: 2018-01-04 | End: 2018-01-19

## 2018-01-04 RX ORDER — PRAVASTATIN SODIUM 80 MG/1
80 TABLET ORAL DAILY
Qty: 15 TABLET | Refills: 0 | Status: SHIPPED | OUTPATIENT
Start: 2018-01-04 | End: 2018-01-19

## 2018-01-08 DIAGNOSIS — E78.5 HYPERLIPIDEMIA LDL GOAL <70: ICD-10-CM

## 2018-01-08 DIAGNOSIS — I10 ESSENTIAL HYPERTENSION: ICD-10-CM

## 2018-01-08 DIAGNOSIS — Z00.00 ROUTINE GENERAL MEDICAL EXAMINATION AT A HEALTH CARE FACILITY: ICD-10-CM

## 2018-01-09 DIAGNOSIS — L24.9 IRRITANT DERMATITIS: ICD-10-CM

## 2018-01-09 DIAGNOSIS — B00.9 HERPES VIRUS DISEASE: ICD-10-CM

## 2018-01-09 NOTE — TELEPHONE ENCOUNTER
Last visit 2-20-17  Recall placed today    Assessment and Plan:   1. Ocular rosacea  - Discussion of eye drops and the patient will talk to her ophthalmologist.   - Discussion that the doxycyline could help with this.      2. Acne rosacea  - Continue tretinoin 0.025% ointment - apply to entire face daily at bedtime. If too irritating or drying, decrease to every other night  - Start doxycyline 100 mg daily  - Continue pimecrolimus (elidel) 1% cream - apply topically bid to redness on the face     3. History of cold sores.   - Continue valacyclovir 1,000 mg twice daily by mouth for two weeks, then stop down to 1,000 mg daily by mouth for maintenance.     4. Few clinically benign nevi - trunk , extremities  - Benign nature was discussed. No further intervention required at this time.   - Monitor lesions for symptoms or change.      5. Cherry angioma(s) - trunk  - Benign nature was discussed. No further intervention required at this time.      Follow up in 1 year, earlier for new or changing lesions.

## 2018-01-10 RX ORDER — PRAVASTATIN SODIUM 80 MG/1
80 TABLET ORAL DAILY
Qty: 30 TABLET | Refills: 0 | OUTPATIENT
Start: 2018-01-10

## 2018-01-10 RX ORDER — VALACYCLOVIR HYDROCHLORIDE 1 G/1
1000 TABLET, FILM COATED ORAL DAILY
Qty: 90 TABLET | Refills: 3 | Status: SHIPPED | OUTPATIENT
Start: 2018-01-10 | End: 2020-02-07

## 2018-01-10 NOTE — TELEPHONE ENCOUNTER
pravastatin (PRAVACHOL)   Last Written Prescription Date:  1/4/18  Last Fill Quantity: 15,   # refills: 0  Last Office Visit : 9/26/16  Future Office visit: none    Scheduling has been notified to contact the pt for appointment.

## 2018-01-19 ENCOUNTER — OFFICE VISIT (OUTPATIENT)
Dept: FAMILY MEDICINE | Facility: CLINIC | Age: 73
End: 2018-01-19
Payer: MEDICARE

## 2018-01-19 VITALS
HEIGHT: 62 IN | SYSTOLIC BLOOD PRESSURE: 155 MMHG | WEIGHT: 135.6 LBS | HEART RATE: 81 BPM | RESPIRATION RATE: 16 BRPM | BODY MASS INDEX: 24.95 KG/M2 | DIASTOLIC BLOOD PRESSURE: 93 MMHG

## 2018-01-19 DIAGNOSIS — I10 ESSENTIAL HYPERTENSION WITH GOAL BLOOD PRESSURE LESS THAN 140/90: ICD-10-CM

## 2018-01-19 DIAGNOSIS — I10 ESSENTIAL HYPERTENSION: ICD-10-CM

## 2018-01-19 DIAGNOSIS — Z12.31 ENCOUNTER FOR SCREENING MAMMOGRAM FOR MALIGNANT NEOPLASM OF BREAST: ICD-10-CM

## 2018-01-19 DIAGNOSIS — F41.9 ANXIETY: ICD-10-CM

## 2018-01-19 DIAGNOSIS — I10 HYPERTENSION, UNSPECIFIED TYPE: ICD-10-CM

## 2018-01-19 DIAGNOSIS — Z00.00 ROUTINE GENERAL MEDICAL EXAMINATION AT A HEALTH CARE FACILITY: ICD-10-CM

## 2018-01-19 DIAGNOSIS — E78.5 HYPERLIPIDEMIA LDL GOAL <70: ICD-10-CM

## 2018-01-19 RX ORDER — LISINOPRIL 10 MG/1
10 TABLET ORAL DAILY
Qty: 90 TABLET | Refills: 3 | Status: SHIPPED | OUTPATIENT
Start: 2018-01-19 | End: 2018-12-08

## 2018-01-19 RX ORDER — HYDROCHLOROTHIAZIDE 50 MG/1
50 TABLET ORAL DAILY
Qty: 90 TABLET | Refills: 3 | Status: SHIPPED | OUTPATIENT
Start: 2018-01-19 | End: 2019-01-15

## 2018-01-19 RX ORDER — PRAVASTATIN SODIUM 80 MG/1
80 TABLET ORAL DAILY
Qty: 90 TABLET | Refills: 3 | Status: SHIPPED | OUTPATIENT
Start: 2018-01-19 | End: 2019-01-15

## 2018-01-19 ASSESSMENT — PAIN SCALES - GENERAL: PAINLEVEL: NO PAIN (0)

## 2018-01-19 NOTE — PROGRESS NOTES
Blood Pressure   BP Readings from Last 1 Encounters:   01/19/18 155/81    Single BP recheck started, 3:23 PM (4 minutes)

## 2018-01-19 NOTE — PATIENT INSTRUCTIONS
Primary Care Center Phone Number [phone]  Primary Care Center Medication Refill Request Information:  * Please contact your pharmacy regarding ANY request for medication refills.  ** [facility] Prescription Fax = [phone]  * Please allow 3 business days for routine medication refills.  * Please allow 5 business days for controlled substance medication refills.     Primary Care Center Test Result notification information:  *You will be notified with in 7-10 days of your appointment day regarding the results of your test.  If you are on MyChart you will be notified as soon as the provider has reviewed the results and signed off on them.      Please schedule the following appointments:  Mammogram:  Breast Center ([facility]) [phone] ([facility] 2nd [facility])  Breast Center ([facility]) [phone] ([address])

## 2018-01-19 NOTE — NURSING NOTE
Chief Complaint   Patient presents with     Recheck Medication     pt is here for a medication follow up       Radha Erwin CMA at 3:21 PM on 1/19/2018

## 2018-01-19 NOTE — MR AVS SNAPSHOT
After Visit Summary   1/19/2018    Kassy Casper    MRN: [id number]           Patient Information     Date Of Birth          1945        Visit Information        Provider Department      1/19/2018 3:35 PM Krunal Oconnell MD [facility]        Today's Diagnoses     Hypertension, unspecified type        Hyperlipidemia LDL goal <70        Routine general medical examination at a health care facility        Essential hypertension        Essential hypertension with goal blood pressure less than 140/90        Anxiety        Encounter for screening mammogram for malignant neoplasm of breast           Care Instructions    Primary Care Center Phone Number [phone]  Primary Care Center Medication Refill Request Information:  * Please contact your pharmacy regarding ANY request for medication refills.  ** [facility] Prescription Fax = [phone]  * Please allow 3 business days for routine medication refills.  * Please allow 5 business days for controlled substance medication refills.     Primary Care Center Test Result notification information:  *You will be notified with in 7-10 days of your appointment day regarding the results of your test.  If you are on MyChart you will be notified as soon as the provider has reviewed the results and signed off on them.      Please schedule the following appointments:  Mammogram:  Breast Center ([facility]) [phone] ([facility] 2nd Floor)  Breast Center ([facility]) [phone] ([address])             Follow-ups after your visit        Follow-up notes from your care team     Return in about 1 year (around 1/19/2019).      Future tests that were ordered for you today     Open Future Orders        Priority Expected Expires Ordered    Lipid panel reflex to direct LDL Fasting Routine 1/19/2018 2/2/2018 1/19/2018    Comprehensive metabolic panel Routine 1/19/2018 2/2/2018 1/19/2018    Mammogram, routine screening Routine   "1/19/2019 1/19/2018            Who to contact     Please call your clinic at [phone] to:    Ask questions about your health    Make or cancel appointments    Discuss your medicines    Learn about your test results    Speak to your doctor   If you have compliments or concerns about an experience at your clinic, or if you wish to file a complaint, please contact [facility] Patient Relations at [phone] or email us at [email or web address]         Additional Information About Your Visit        [employer] Information     [employer] gives you secure access to your electronic health record. If you see a primary care provider, you can also send messages to your care team and make appointments. If you have questions, please call your primary care clinic.  If you do not have a primary care provider, please call [phone] and they will assist you.      [employer] is an electronic gateway that provides easy, online access to your medical records. With [employer], you can request a clinic appointment, read your test results, renew a prescription or communicate with your care team.     To access your existing account, please contact your [facility] or call [phone] for assistance.        Care EveryWhere ID     This is your Care EveryWhere ID. This could be used by other organizations to access your [facility] medical records  [id number]        Your Vitals Were     Pulse Respirations Height Breastfeeding? BMI (Body Mass Index)       81 16 1.575 m (5' 2\") No 24.8 kg/m2        Blood Pressure from Last 3 Encounters:   01/19/18 (!) 155/93   09/26/16 130/84   01/28/16 109/73    Weight from Last 3 Encounters:   01/19/18 61.5 kg (135 lb 9.6 oz)   09/26/16 61.2 kg (135 lb)   01/28/16 60.2 kg (132 lb 12.8 oz)              We Performed the Following     FLU VACCINE HIGH DOSE PRESERVATIVE FREE, AGE =>65 YR     TDAP VACCINE (BOOSTRIX)          Today's Medication Changes "          These changes are accurate as of: 1/19/18  3:36 PM.  If you have any questions, ask your nurse or doctor.               These medicines have changed or have updated prescriptions.        Dose/Directions    lisinopril 10 MG tablet   Commonly known as:  PRINIVIL/ZESTRIL   This may have changed:  additional instructions   Used for:  Hypertension, unspecified type, Hyperlipidemia LDL goal <70, Routine general medical examination at a health care facility   Changed by:  Krunal Oconnell MD        Dose:  10 mg   Take 1 tablet (10 mg) by mouth daily   Quantity:  90 tablet   Refills:  3       pravastatin 80 MG tablet   Commonly known as:  PRAVACHOL   This may have changed:  additional instructions   Used for:  Hyperlipidemia LDL goal <70, Essential hypertension, Routine general medical examination at a health care facility   Changed by:  Krunal Oconnell MD        Dose:  80 mg   Take 1 tablet (80 mg) by mouth daily   Quantity:  90 tablet   Refills:  3            Where to get your medicines      These medications were sent to [place] [address] [place] [address] [address]     Phone:  [phone]     hydrochlorothiazide 50 MG tablet    lisinopril 10 MG tablet    pravastatin 80 MG tablet                Primary Care Provider Office Phone # Fax #    Krunal Oconnell [phone]       [address] [address]        Equal Access to Services     [facility]ID [facility] : Hadii james martinez hadasho Soomaali, waaxda luqadaha, qaybta kaalmada adeegyada, ansley mcneil. So [facility] [place] [phone].    ATENCIÓN: Si habla español, tiene a hale disposición servicios gratuitos de asistencia lingüística. Llame al [phone].    We comply with applicable federal civil rights laws and [place] laws. We do not discriminate on the basis of race, color, national origin, age, disability, sex, sexual orientation, or gender  identity.            Thank you!     Thank you for choosing [facility]  for your care. Our goal is always to provide you with excellent care. Hearing back from our patients is one way we can continue to improve our services. Please take a few minutes to complete the written survey that you may receive in the mail after your visit with us. Thank you!             Your Updated Medication List - Protect others around you: Learn how to safely use, store and throw away your medicines at www.disposemymeds.org.          This list is accurate as of: 1/19/18  3:36 PM.  Always use your most recent med list.                   Brand Name Dispense Instructions for use Diagnosis    acyclovir 5 % ointment    ZOVIRAX    30 g    Apply topically 5 times daily To affected area at lower lip until resolved.    Cold sore       albuterol 108 (90 BASE) MCG/ACT Inhaler    PROAIR HFA/PROVENTIL HFA/VENTOLIN HFA    1 Inhaler    Inhale 2 puffs into the lungs every 6 hours    Intermittent asthma       ALPRAZolam 0.5 MG tablet    XANAX    30 tablet    Take 1 tablet (0.5 mg) by mouth nightly as needed for anxiety . Patient needs to see primary provider for further refills.    Anxiety       aspirin 81 MG tablet      Take 1 tablet by mouth daily.        ciprofloxacin 500 MG tablet    CIPRO    10 tablet    Take 1 tablet (500 mg) by mouth 2 times daily    Dysuria       clindamycin 1 % lotion    CLEOCIN T    60 mL    Apply to entire face twice daily until improved.    Irritant dermatitis       desonide 0.05 % ointment    DESOWEN    60 g    Apply topically 2 times daily To itchy rash and red areas of face until improved. Avoid eyes.    Irritant dermatitis       doxycycline monohydrate 100 MG capsule     90 capsule    Take 100 mg daily. Take with a full glass of water. Avoid taking immediately before bed.    Rosacea       EPINEPHrine 0.3 MG/0.3ML injection    EPIPEN    2 each    Inject 0.3 mLs (0.3 mg) into the muscle once as needed for  anaphylaxis    Urticaria, unspecified       fluticasone 50 MCG/ACT spray    FLONASE    1 Package    Spray 2 sprays into both nostrils daily    Acute sinusitis with symptoms > 10 days       hydrochlorothiazide 50 MG tablet    HYDRODIURIL    90 tablet    Take 1 tablet (50 mg) by mouth daily    Essential hypertension with goal blood pressure less than 140/90, Hyperlipidemia LDL goal <70, Routine general medical examination at a health care facility       lisinopril 10 MG tablet    PRINIVIL/ZESTRIL    90 tablet    Take 1 tablet (10 mg) by mouth daily    Hypertension, unspecified type, Hyperlipidemia LDL goal <70, Routine general medical examination at a health care facility       loratadine 10 MG tablet    CLARITIN    30 tablet    Take 1 tablet (10 mg) by mouth daily as needed for allergies (for hives)    Urticaria, unspecified       MULTIvitamin  S Caps      Take 1 tablet by mouth daily.        neomycin-polymyxin-dexamethasone 3.5-03140-8.1 Oint ophthalmic ointment    MAXITROL    1 Tube    Place 1 Application into the right eye 2 times daily    Contact and allergic dermatitis of eyelid       pimecrolimus 1 % cream    ELIDEL    60 g    Apply topically 2 times daily To redness on the face    Rosacea       pravastatin 80 MG tablet    PRAVACHOL    90 tablet    Take 1 tablet (80 mg) by mouth daily    Hyperlipidemia LDL goal <70, Essential hypertension, Routine general medical examination at a health care facility       predniSONE 20 MG tablet    DELTASONE    20 tablet    Take 1 tablet (20 mg) by mouth 2 times daily    Urticaria, unspecified       sulfamethoxazole-trimethoprim 800-160 MG per tablet    BACTRIM DS    20 tablet    Take 1 tablet by mouth 2 times daily    Irritant dermatitis       traZODone 100 MG tablet    DESYREL    90 tablet    Take 1 tablet (100 mg) by mouth nightly as needed for sleep    Other insomnia       tretinoin 0.025 % cream    RETIN-A    45 g    Apply to entire face at bedtime. Skip to every other  night if too irritating.    Rosacea       triamcinolone 0.1 % ointment    KENALOG    60 g    Apply topically 2 times daily To itchy and red areas of the hands as needed until improved.    Irritant dermatitis       valACYclovir 1000 mg tablet    VALTREX    90 tablet    Take 1 tablet (1,000 mg) by mouth daily    Herpes virus disease       VITAMIN D (CHOLECALCIFEROL) PO

## 2018-01-19 NOTE — PROGRESS NOTES
[facility]  Primary Care Center   Krunal Oconnell MD  1/19/2018     Chief Complaint:   Recheck Medication (pt is here for a medication follow up)       History of Present Illness:   Kassy Casper is a 72 year old female with a history of coronary artery disease, coronary atherosclerosis, hypertension, hyperlipidemia, and uncomplicated asthma, who presents alone for routine labs, immunizations, and medication renewal. On presentation today, the patient's blood pressure was elevated. She notes that she took an antihistamine last night which she believes might be contributing to this elevated value. The patient mentions that she occasionally checks her blood pressure at home, which is typically in the 110/70 range. She does mention that she has not checked her pressures recently at home however.      She does mention exercising on a treadmill for an hour a day, in addition to walking for extended [place] of [place].    Health Care Maintenance/Other:  1. Mammogram- needs to be updated  2. Tetanus- to be administered in clinic today   3. Flu shot- to be administered in clinic today   4. Routine laboratory orders- placed in clinic today  5. Dexa scan- up to date  6. Colonoscopy- up to date     Review of Systems:   Pertinent items are noted in HPI.  All other systems are negative.    Active Medications:      valACYclovir (VALTREX) 1000 mg tablet, Take 1 tablet (1,000 mg) by mouth daily, Disp: 90 tablet, Rfl: 3     lisinopril (PRINIVIL/ZESTRIL) 10 MG tablet, Take 1 tablet (10 mg) by mouth daily . Patient needs to see primary provider and have labs for further refills., Disp: 15 tablet, Rfl: 0     pravastatin (PRAVACHOL) 80 MG tablet, Take 1 tablet (80 mg) by mouth daily Patient needs to see primary provider and have labs for further refills., Disp: 15 tablet, Rfl: 0     hydrochlorothiazide (HYDRODIURIL) 50 MG tablet, Take 1 tablet (50 mg) by mouth daily, Disp: 30 tablet, Rfl: 0      neomycin-polymyxin-dexamethasone (MAXITROL) 3.5-08729-6.1 OINT ophthalmic ointment, Place 1 Application into the right eye 2 times daily, Disp: 1 Tube, Rfl: 1     ALPRAZolam (XANAX) 0.5 MG tablet, Take 1 tablet (0.5 mg) by mouth nightly as needed for anxiety . Patient needs to see primary provider for further refills., Disp: 30 tablet, Rfl: 0     ciprofloxacin (CIPRO) 500 MG tablet, Take 1 tablet (500 mg) by mouth 2 times daily, Disp: 10 tablet, Rfl: 0     doxycycline Monohydrate 100 MG CAPS, Take 100 mg daily. Take with a full glass of water. Avoid taking immediately before bed., Disp: 90 capsule, Rfl: 3     tretinoin (RETIN-A) 0.025 % cream, Apply to entire face at bedtime. Skip to every other night if too irritating., Disp: 45 g, Rfl: 11     pimecrolimus (ELIDEL) 1 % cream, Apply topically 2 times daily To redness on the face, Disp: 60 g, Rfl: 11     traZODone (DESYREL) 100 MG tablet, Take 1 tablet (100 mg) by mouth nightly as needed for sleep, Disp: 90 tablet, Rfl: 3     desonide (DESOWEN) 0.05 % ointment, Apply topically 2 times daily To itchy rash and red areas of face until improved. Avoid eyes., Disp: 60 g, Rfl: 1     triamcinolone (KENALOG) 0.1 % ointment, Apply topically 2 times daily To itchy and red areas of the hands as needed until improved., Disp: 60 g, Rfl: 5     clindamycin (CLEOCIN T) 1 % lotion, Apply to entire face twice daily until improved., Disp: 60 mL, Rfl: 11     sulfamethoxazole-trimethoprim (BACTRIM DS) 800-160 MG per tablet, Take 1 tablet by mouth 2 times daily, Disp: 20 tablet, Rfl: 0     predniSONE (DELTASONE) 20 MG tablet, Take 1 tablet (20 mg) by mouth 2 times daily, Disp: 20 tablet, Rfl: 1     acyclovir (ZOVIRAX) 5 % ointment, Apply topically 5 times daily To affected area at lower lip until resolved., Disp: 30 g, Rfl: 11     albuterol (PROAIR HFA, PROVENTIL HFA, VENTOLIN HFA) 108 (90 BASE) MCG/ACT inhaler, Inhale 2 puffs into the lungs every 6 hours, Disp: 1 Inhaler, Rfl: 3      "VITAMIN D, CHOLECALCIFEROL, PO, , Disp: , Rfl:      fluticasone (FLONASE) 50 MCG/ACT nasal spray, Spray 2 sprays into both nostrils daily, Disp: 1 Package, Rfl: 3     loratadine (CLARITIN) 10 MG tablet, Take 1 tablet (10 mg) by mouth daily as needed for allergies (for hives), Disp: 30 tablet, Rfl: 6     EPINEPHrine (EPIPEN) 0.3 MG/0.3ML injection, Inject 0.3 mLs (0.3 mg) into the muscle once as needed for anaphylaxis, Disp: 2 each, Rfl: 3     Multiple Vitamin (MULTIVITAMIN  S) CAPS, Take 1 tablet by mouth daily., Disp: , Rfl:      aspirin 81 MG tablet, Take 1 tablet by mouth daily., Disp: , Rfl:       Allergies:   Latex  Minocycline Hcl  Penicillin [penicillins]      Past Medical History:  Benign neoplasm of choroid   CAD (coronary artery disease)   Coronary atherosclerosis of unspecified type of vessel, native or graft   Disorder of bone and cartilage, unspecified   Unspecified essential hyperlipidemia LDL goal <70   Hypertension; LDL goal <100  Insomnia   PVD (posterior vitreous detachment), right eye   Uncomplicated asthma   Rosacea   Post-inflammatory hyperpigmentation   Lumbago   Acne   History of cold sores      Past Surgical History:  Angioplasty   Colonoscopy       Family History:   Asthma   Allergies   Glaucoma   Alzheimer's diease   Breast cancer   Eye disorder   Skin cancer   Heart disease (Other)   Melanoma       Social History:   The patient is , a nonsmoker, and does consume alcohol.      Physical Exam:   BP (!) 155/93  Pulse 81  Resp 16  Ht 1.575 m (5' 2\")  Wt 61.5 kg (135 lb 9.6 oz)  Breastfeeding? No  BMI 24.8 kg/m2      Constitutional: Alert. In no distress.  Neurologic: Gait normal. Reflexes normal and symmetric. Sensation grossly WNL.  Psychiatric: Mentation appears normal. Normal affect.      Laboratory:  Lipid panel reflex to direct LDL: ordered   Comprehensive metabolic panel: ordered     Assessment and Plan:  1. Hypertension, unspecified type  Patient has been monitoring at home " with ranges in the 110s/70s. Elevated on presentation today. Patient should continue to monitor at home and call the clinic if she has multiple repeat elevations. Prescription renewal provided at this time.   - lisinopril (PRINIVIL/ZESTRIL) 10 MG tablet; Take 1 tablet (10 mg) by mouth daily  Dispense: 90 tablet; Refill: 3  - pravastatin (PRAVACHOL) 80 MG tablet; Take 1 tablet (80 mg) by mouth daily  Dispense: 90 tablet; Refill: 3  - hydrochlorothiazide (HYDRODIURIL) 50 MG tablet; Take 1 tablet (50 mg) by mouth daily  Dispense: 90 tablet; Refill: 3    2. Hyperlipidemia LDL goal <70  Controlled with medications. Prescription refills provided. Laboratory orders placed for further evaluation of the level of control of the patient's chronic condition.   - Lipid panel reflex to direct LDL Fasting; Future  - Comprehensive metabolic panel; Future  - lisinopril (PRINIVIL/ZESTRIL) 10 MG tablet; Take 1 tablet (10 mg) by mouth daily  Dispense: 90 tablet; Refill: 3  - pravastatin (PRAVACHOL) 80 MG tablet; Take 1 tablet (80 mg) by mouth daily  Dispense: 90 tablet; Refill: 3  - hydrochlorothiazide (HYDRODIURIL) 50 MG tablet; Take 1 tablet (50 mg) by mouth daily  Dispense: 90 tablet; Refill: 3    3. Routine general medical examination at a health care facility  Updated status in clinic today. Flu and TDAP vaccination administered. Mammogram order placed to be updated. Dexa scan and colonoscopy are both up to date at this time.   - FLU VACCINE HIGH DOSE PRESERVATIVE FREE, AGE =>65 YR  - TDAP VACCINE (BOOSTRIX)  - lisinopril (PRINIVIL/ZESTRIL) 10 MG tablet; Take 1 tablet (10 mg) by mouth daily  Dispense: 90 tablet; Refill: 3  - pravastatin (PRAVACHOL) 80 MG tablet; Take 1 tablet (80 mg) by mouth daily  Dispense: 90 tablet; Refill: 3  - hydrochlorothiazide (HYDRODIURIL) 50 MG tablet; Take 1 tablet (50 mg) by mouth daily  Dispense: 90 tablet; Refill: 3  - Mammogram, routine screening; Future  -Up to date on colonoscopy and bone  density    4. Anxiety  Patient uses occasional Xanax when flying. She does not currently need a prescription refill as she does not intend on traveling soon. She will contact the clinic if she is in need of a medication refill.         Follow-up: Return in about 1 year (around 1/19/2019).         Scribe Disclosure:   I, Karon Kirti, am serving as a scribe to document services personally performed by Krunal Oconnell MD at this visit, based upon the provider's statements to me. All documentation has been reviewed by the aforementioned provider prior to being entered into the official medical record.     Portions of this medical record were completed by a scribe. UPON MY REVIEW AND AUTHENTICATION BY ELECTRONIC SIGNATURE, this confirms (a) I performed the applicable clinical services, and (b) the record is accurate.   Krunal Oconnell MD

## 2018-02-01 DIAGNOSIS — F41.9 ANXIETY: ICD-10-CM

## 2018-02-01 RX ORDER — ALPRAZOLAM 0.5 MG
0.5 TABLET ORAL
Qty: 30 TABLET | Refills: 0
Start: 2018-02-01 | End: 2018-04-25

## 2018-02-23 DIAGNOSIS — L71.9 ROSACEA: ICD-10-CM

## 2018-02-23 RX ORDER — DOXYCYCLINE 100 MG/1
CAPSULE ORAL
Qty: 90 CAPSULE | Refills: 1 | Status: SHIPPED | OUTPATIENT
Start: 2018-02-23 | End: 2020-02-06

## 2018-02-23 NOTE — TELEPHONE ENCOUNTER
Last apt: 2/20/17  Future apt: None     Assessment and Plan:   1. Ocular rosacea  - Discussion of eye drops and the patient will talk to her ophthalmologist.   - Discussion that the doxycyline could help with this.      2. Acne rosacea  - Continue tretinoin 0.025% ointment - apply to entire face daily at bedtime. If too irritating or drying, decrease to every other night  - Start doxycyline 100 mg daily  - Continue pimecrolimus (elidel) 1% cream - apply topically bid to redness on the face     3. History of cold sores.   - Continue valacyclovir 1,000 mg twice daily by mouth for two weeks, then stop down to 1,000 mg daily by mouth for maintenance.     4. Few clinically benign nevi - trunk , extremities  - Benign nature was discussed. No further intervention required at this time.   - Monitor lesions for symptoms or change.      5. Cherry angioma(s) - trunk  - Benign nature was discussed. No further intervention required at this time.      Follow up in 1 year, earlier for new or changing lesions. .

## 2018-03-21 ENCOUNTER — RADIANT APPOINTMENT (OUTPATIENT)
Dept: MAMMOGRAPHY | Facility: CLINIC | Age: 73
End: 2018-03-21
Payer: MEDICARE

## 2018-03-21 DIAGNOSIS — E78.5 HYPERLIPIDEMIA LDL GOAL <70: ICD-10-CM

## 2018-03-21 DIAGNOSIS — Z12.31 VISIT FOR SCREENING MAMMOGRAM: ICD-10-CM

## 2018-03-21 DIAGNOSIS — Z12.31 ENCOUNTER FOR SCREENING MAMMOGRAM FOR MALIGNANT NEOPLASM OF BREAST: ICD-10-CM

## 2018-03-21 DIAGNOSIS — Z00.00 ROUTINE GENERAL MEDICAL EXAMINATION AT A HEALTH CARE FACILITY: ICD-10-CM

## 2018-03-21 LAB
ALBUMIN SERPL-MCNC: 3.9 G/DL (ref 3.4–5)
ALP SERPL-CCNC: 70 U/L (ref 40–150)
ALT SERPL W P-5'-P-CCNC: 29 U/L (ref 0–50)
ANION GAP SERPL CALCULATED.3IONS-SCNC: 4 MMOL/L (ref 3–14)
AST SERPL W P-5'-P-CCNC: 23 U/L (ref 0–45)
BILIRUB SERPL-MCNC: 0.4 MG/DL (ref 0.2–1.3)
BUN SERPL-MCNC: 15 MG/DL (ref 7–30)
CALCIUM SERPL-MCNC: 9 MG/DL (ref 8.5–10.1)
CHLORIDE SERPL-SCNC: 104 MMOL/L (ref 94–109)
CHOLEST SERPL-MCNC: 193 MG/DL
CO2 SERPL-SCNC: 32 MMOL/L (ref 20–32)
CREAT SERPL-MCNC: 0.71 MG/DL (ref 0.52–1.04)
GFR SERPL CREATININE-BSD FRML MDRD: 81 ML/MIN/1.7M2
GLUCOSE SERPL-MCNC: 91 MG/DL (ref 70–99)
HDLC SERPL-MCNC: 86 MG/DL
LDLC SERPL CALC-MCNC: 84 MG/DL
NONHDLC SERPL-MCNC: 107 MG/DL
POTASSIUM SERPL-SCNC: 3.4 MMOL/L (ref 3.4–5.3)
PROT SERPL-MCNC: 7.2 G/DL (ref 6.8–8.8)
SODIUM SERPL-SCNC: 140 MMOL/L (ref 133–144)
TRIGL SERPL-MCNC: 114 MG/DL

## 2018-04-25 DIAGNOSIS — F41.9 ANXIETY: ICD-10-CM

## 2018-04-25 RX ORDER — ALPRAZOLAM 0.5 MG
0.5 TABLET ORAL
Qty: 30 TABLET | Refills: 0
Start: 2018-04-25 | End: 2018-07-18

## 2018-05-04 DIAGNOSIS — Z00.00 ROUTINE GENERAL MEDICAL EXAMINATION AT A HEALTH CARE FACILITY: ICD-10-CM

## 2018-05-04 DIAGNOSIS — E78.5 HYPERLIPIDEMIA LDL GOAL <70: ICD-10-CM

## 2018-05-04 DIAGNOSIS — I10 HTN (HYPERTENSION): ICD-10-CM

## 2018-05-06 RX ORDER — LISINOPRIL 10 MG/1
TABLET ORAL
Qty: 90 TABLET | Refills: 2 | OUTPATIENT
Start: 2018-05-06

## 2018-06-15 DIAGNOSIS — G47.09 OTHER INSOMNIA: ICD-10-CM

## 2018-06-16 RX ORDER — TRAZODONE HYDROCHLORIDE 100 MG/1
100 TABLET ORAL
Qty: 90 TABLET | Refills: 1 | Status: SHIPPED | OUTPATIENT
Start: 2018-06-16 | End: 2018-12-17

## 2018-07-18 DIAGNOSIS — F41.9 ANXIETY: ICD-10-CM

## 2018-07-18 RX ORDER — ALPRAZOLAM 0.5 MG
0.5 TABLET ORAL
Qty: 30 TABLET | Refills: 0
Start: 2018-07-18 | End: 2018-10-23

## 2018-08-24 DIAGNOSIS — L71.9 ROSACEA: ICD-10-CM

## 2018-08-27 RX ORDER — TRETINOIN 0.25 MG/G
CREAM TOPICAL
Qty: 45 G | Refills: 11 | OUTPATIENT
Start: 2018-08-27

## 2018-08-27 NOTE — TELEPHONE ENCOUNTER
tretinoin (RETIN-A) 0.025 % cream    Last Written Prescription Date:  2/20/17  Last Fill Quantity: 45g,   # refills: 11  Last Office Visit : 2/20/17  Future Office visit: none    Refused. lv > 12 mths

## 2018-10-23 DIAGNOSIS — F41.9 ANXIETY: ICD-10-CM

## 2018-10-24 RX ORDER — ALPRAZOLAM 0.5 MG
0.5 TABLET ORAL
Qty: 30 TABLET | Refills: 0 | Status: SHIPPED | OUTPATIENT
Start: 2018-10-24 | End: 2019-03-02

## 2018-10-24 NOTE — TELEPHONE ENCOUNTER
ALPRAZolam (XANAX) 0.5 MG tablet   Last Written Prescription Date:  7/18/18  Last Fill Quantity: 30,   # refills: 0  Last Office Visit :1/19/18  Future Office visit:  None      Routing  Because:  controlled

## 2018-12-08 DIAGNOSIS — E78.5 HYPERLIPIDEMIA LDL GOAL <70: ICD-10-CM

## 2018-12-08 DIAGNOSIS — Z00.00 ROUTINE GENERAL MEDICAL EXAMINATION AT A HEALTH CARE FACILITY: ICD-10-CM

## 2018-12-08 DIAGNOSIS — I10 HYPERTENSION, UNSPECIFIED TYPE: ICD-10-CM

## 2018-12-10 RX ORDER — LISINOPRIL 10 MG/1
TABLET ORAL
Qty: 90 TABLET | Refills: 0 | Status: SHIPPED | OUTPATIENT
Start: 2018-12-10 | End: 2020-02-06

## 2018-12-10 NOTE — TELEPHONE ENCOUNTER
Last Clinic Visit: 1/19/2018 [facility]  Due for follow up. Message to clinic coordinators and pharmacy.  Failed protocol for elevated BP at last appointment 1/19/18. 90 day dany refilled.  Message to clinic RN.

## 2018-12-11 NOTE — TELEPHONE ENCOUNTER
Patient called and schedule for follow up appointment with PCP on Wed, Jan 30, 2019 at 1:20 PM. Appointment confirm via patient.

## 2018-12-17 DIAGNOSIS — G47.09 OTHER INSOMNIA: ICD-10-CM

## 2018-12-17 RX ORDER — TRAZODONE HYDROCHLORIDE 100 MG/1
100 TABLET ORAL
Qty: 90 TABLET | Refills: 0 | Status: SHIPPED | OUTPATIENT
Start: 2018-12-17 | End: 2019-02-05 | Stop reason: ALTCHOICE

## 2018-12-17 NOTE — TELEPHONE ENCOUNTER
Last Clinic Visit: 1/19/2018 [facility]  Due for follow up. Has upcoming appointment 1/30/19

## 2019-01-15 DIAGNOSIS — E78.5 HYPERLIPIDEMIA LDL GOAL <70: ICD-10-CM

## 2019-01-15 DIAGNOSIS — I10 ESSENTIAL HYPERTENSION: ICD-10-CM

## 2019-01-15 DIAGNOSIS — I10 ESSENTIAL HYPERTENSION WITH GOAL BLOOD PRESSURE LESS THAN 140/90: ICD-10-CM

## 2019-01-15 DIAGNOSIS — Z00.00 ROUTINE GENERAL MEDICAL EXAMINATION AT A HEALTH CARE FACILITY: ICD-10-CM

## 2019-01-16 RX ORDER — HYDROCHLOROTHIAZIDE 50 MG/1
50 TABLET ORAL DAILY
Qty: 90 TABLET | Refills: 0 | Status: SHIPPED | OUTPATIENT
Start: 2019-01-16 | End: 2019-02-02

## 2019-01-16 RX ORDER — PRAVASTATIN SODIUM 80 MG/1
80 TABLET ORAL DAILY
Qty: 90 TABLET | Refills: 0 | Status: SHIPPED | OUTPATIENT
Start: 2019-01-16 | End: 2019-02-02

## 2019-01-16 NOTE — TELEPHONE ENCOUNTER
Pravastatin 80 mg po every day    Sent to pharmacy 90 with no refills    Must keep appt. with PCP on 1/30/2019      Hydrachlorathiazide 50 mg po every day    Sent to pharmacy 90 with no refills    Must keep appt with PCP on 1/30/2019    FYI last B/P over 140/90

## 2019-01-17 DIAGNOSIS — B00.9 HERPES VIRUS DISEASE: ICD-10-CM

## 2019-01-19 RX ORDER — VALACYCLOVIR HYDROCHLORIDE 1 G/1
1000 TABLET, FILM COATED ORAL DAILY
Qty: 90 TABLET | Refills: 3 | OUTPATIENT
Start: 2019-01-19

## 2019-01-19 NOTE — TELEPHONE ENCOUNTER
"  valACYclovir (VALTREX) 1000 mg tablet   Last Written Prescription Date:  1/10/18  Last Fill Quantity: 90,   # refills: 3  Last Office Visit : 2/20/17  Future Office visit:  None    2/20/17  Bandar  \" 3. History of cold sores.   - Continue valacyclovir 1,000 mg twice daily by mouth for two weeks, then stop down to 1,000 mg daily by mouth for maintenance.\"     Follow up in 1 year    Scheduling has been notified to contact the pt for appointment.    Process 1    Refused.  "

## 2019-01-23 ENCOUNTER — TELEPHONE (OUTPATIENT)
Dept: DERMATOLOGY | Facility: CLINIC | Age: 74
End: 2019-01-23

## 2019-01-23 NOTE — TELEPHONE ENCOUNTER
----- Message from Nisha Allison RN sent at 1/19/2019  9:35 AM CST -----  Pt   KENN GUILLERMO [[id number]]  Derm  [place]    Please call to schedule.    Thanks    I do not need any follow up on the scheduling of this appointment unless the patient will no longer be receiving care in our clinic.

## 2019-02-02 ENCOUNTER — OFFICE VISIT (OUTPATIENT)
Dept: FAMILY MEDICINE | Facility: CLINIC | Age: 74
End: 2019-02-02
Payer: MEDICARE

## 2019-02-02 VITALS
RESPIRATION RATE: 16 BRPM | DIASTOLIC BLOOD PRESSURE: 92 MMHG | WEIGHT: 147.1 LBS | BODY MASS INDEX: 26.9 KG/M2 | HEART RATE: 69 BPM | SYSTOLIC BLOOD PRESSURE: 156 MMHG

## 2019-02-02 DIAGNOSIS — Z78.0 POSTMENOPAUSAL STATUS: Primary | ICD-10-CM

## 2019-02-02 DIAGNOSIS — I10 ESSENTIAL HYPERTENSION WITH GOAL BLOOD PRESSURE LESS THAN 140/90: ICD-10-CM

## 2019-02-02 DIAGNOSIS — I10 HYPERTENSION, UNSPECIFIED TYPE: ICD-10-CM

## 2019-02-02 DIAGNOSIS — T16.2XXA ACUTE FOREIGN BODY OF LEFT EAR, INITIAL ENCOUNTER: ICD-10-CM

## 2019-02-02 DIAGNOSIS — E78.5 HYPERLIPIDEMIA LDL GOAL <70: ICD-10-CM

## 2019-02-02 DIAGNOSIS — G47.09 OTHER INSOMNIA: ICD-10-CM

## 2019-02-02 DIAGNOSIS — I10 ESSENTIAL HYPERTENSION: ICD-10-CM

## 2019-02-02 DIAGNOSIS — Z00.00 ROUTINE GENERAL MEDICAL EXAMINATION AT A HEALTH CARE FACILITY: ICD-10-CM

## 2019-02-02 RX ORDER — DOXEPIN HYDROCHLORIDE 10 MG/1
10 CAPSULE ORAL AT BEDTIME
Qty: 90 CAPSULE | Refills: 3 | Status: SHIPPED | OUTPATIENT
Start: 2019-02-02 | End: 2020-02-06

## 2019-02-02 RX ORDER — LISINOPRIL 20 MG/1
20 TABLET ORAL DAILY
Qty: 90 TABLET | Refills: 3 | Status: SHIPPED | OUTPATIENT
Start: 2019-02-02 | End: 2020-02-06

## 2019-02-02 RX ORDER — HYDROCHLOROTHIAZIDE 50 MG/1
50 TABLET ORAL DAILY
Qty: 90 TABLET | Refills: 3 | Status: SHIPPED | OUTPATIENT
Start: 2019-02-02 | End: 2020-02-06

## 2019-02-02 RX ORDER — PRAVASTATIN SODIUM 80 MG/1
80 TABLET ORAL DAILY
Qty: 90 TABLET | Refills: 3 | Status: SHIPPED | OUTPATIENT
Start: 2019-02-02 | End: 2020-02-06

## 2019-02-02 ASSESSMENT — PAIN SCALES - GENERAL: PAINLEVEL: NO PAIN (0)

## 2019-02-02 NOTE — PATIENT INSTRUCTIONS
Primary Care Center Phone Number [phone]  Primary [facility] Medication Refill Request Information:  * Please contact your pharmacy regarding ANY request for medication refills.  ** [facility] Prescription Fax = [phone]  * Please allow 3 business days for routine medication refills.  * Please allow 5 business days for controlled substance medication refills.     Primary Care Center Test Result notification information:  *You will be notified with in 7-10 days of your appointment day regarding the results of your test.  If you are on MyChart you will be notified as soon as the provider has reviewed the results and signed off on them.    The Primary Care Clinic now has the Shingrix vaccine available.     However, not all insurance carriers cover the entire cost of the Shingrix vaccine if the vaccine is administered at a primary clinic.     Prior to receiving the vaccine, we recommend that you call your insurance carrier and ask them the following questions:     * Does my insurance cover the Shingrix vaccine and administration of the vaccine?   * What is my co-pay or deductible for the vaccine?   * Is there a cost difference if I receive the vaccine at my doctors office or at a pharmacy?     Unfortunately, the clinic cannot determine your insurance benefits.  Please call your insurance provider prior to scheduling an appointment to receive the Shingrix vaccine.    If you decide to receive your vaccine at the Primary Care Clinic please call [phone] and request a nurse only appointment for the Shingrix vaccine.     This vaccine comes in two doses. The second dose should be 2-6 months from your first Shingrix dose.

## 2019-02-02 NOTE — PROGRESS NOTES
[facility]  Primary Care Center   Krunal Oconnell MD  02/02/2019      Chief Complaint:   Annual, Medication refill     History of Present Illness:   Kassy Casper is a 73 year old female with a history of CAD, HTN, hyperlipidemia, and uncomplicated asthma who presents for an annual visit and medication refill.    She has noticed her blood pressure remains elevated despite compliance with her Lisinopril and Hydrochlorothiazide.  Here her pressure is 156/92 and at home it is not quite that high but still elevated.  Her diet has not changed significantly in the past year although her weight is up about 12 pounds.  She is still running for 30 minutes per day.      [place] and a [place] branch briefly went into her left ear canal.  She did not have any bleeding from her ear however since that time she has had a persistent sound in her ear similar to having water in your ear.  Her hearing is normal and she denies any ear pain but the persistence of this sound is annoying.    She had a Dexa scan several years ago at an outside facility (sometime in the past 10 years) and was on Raloxifene after this.  However, she later received a letter stating the Dexa machine was calibrated incorrectly therefore she did not really need the medication.  Dexa done here in 2013 showed low bone density.     When she goes to bed, she can sleep for 2.5 hours but then wakes and is up for about 4 hours.  She will be able to eventually get back to sleep for another couple hours.  She denies any anxiety or depression and notes this has been her sleep pattern her entire life.  She has been prescribed Trazodone but does not like to take it as it upsets her stomach.      Other concerns discussed/health maintenance:  1.  Mammogram - up to date, due in March  2.  Colonoscopy - up to date, due 2025  2.  Shingrix vaccine - recommended, she will check her insurance     Review of Systems:   Pertinent  items are noted in HPI, remainder of complete ROS is negative.      Active Medications:      acyclovir (ZOVIRAX) 5 % ointment, Apply topically 5 times daily To affected area at lower lip until resolved., Disp: 30 g, Rfl: 11     albuterol (PROAIR HFA, PROVENTIL HFA, VENTOLIN HFA) 108 (90 BASE) MCG/ACT inhaler, Inhale 2 puffs into the lungs every 6 hours, Disp: 1 Inhaler, Rfl: 3     ALPRAZolam (XANAX) 0.5 MG tablet, Take 1 tablet (0.5 mg) by mouth nightly as needed for anxiety, Disp: 30 tablet, Rfl: 0     aspirin 81 MG tablet, Take 1 tablet by mouth daily., Disp: , Rfl:      ciprofloxacin (CIPRO) 500 MG tablet, Take 1 tablet (500 mg) by mouth 2 times daily, Disp: 10 tablet, Rfl: 0     clindamycin (CLEOCIN T) 1 % lotion, Apply to entire face twice daily until improved., Disp: 60 mL, Rfl: 11     desonide (DESOWEN) 0.05 % ointment, Apply topically 2 times daily To itchy rash and red areas of face until improved. Avoid eyes., Disp: 60 g, Rfl: 1     doxycycline monohydrate 100 MG capsule, Take 100 mg daily. Take with a full glass of water. Avoid taking immediately before bed., Disp: 90 capsule, Rfl: 1     EPINEPHrine (EPIPEN) 0.3 MG/0.3ML injection, Inject 0.3 mLs (0.3 mg) into the muscle once as needed for anaphylaxis, Disp: 2 each, Rfl: 3     fluticasone (FLONASE) 50 MCG/ACT nasal spray, Spray 2 sprays into both nostrils daily, Disp: 1 Package, Rfl: 3     hydrochlorothiazide (HYDRODIURIL) 50 MG tablet, TAKE 1 TABLET (50 MG) BY MOUTH DAILY, Disp: 90 tablet, Rfl: 0     lisinopril (PRINIVIL/ZESTRIL) 10 MG tablet, TAKE 1 TABLET (10 MG) BY MOUTH DAILY, Disp: 90 tablet, Rfl: 0     loratadine (CLARITIN) 10 MG tablet, Take 1 tablet (10 mg) by mouth daily as needed for allergies (for hives), Disp: 30 tablet, Rfl: 6     Multiple Vitamin (MULTIVITAMIN  S) CAPS, Take 1 tablet by mouth daily., Disp: , Rfl:      neomycin-polymyxin-dexamethasone (MAXITROL) 3.5-63611-1.1 OINT ophthalmic ointment, Place 1 Application into the right eye 2  times daily, Disp: 1 Tube, Rfl: 1     pimecrolimus (ELIDEL) 1 % cream, Apply topically 2 times daily To redness on the face, Disp: 60 g, Rfl: 11     pravastatin (PRAVACHOL) 80 MG tablet, TAKE 1 TABLET (80 MG) BY MOUTH DAILY, Disp: 90 tablet, Rfl: 0     predniSONE (DELTASONE) 20 MG tablet, Take 1 tablet (20 mg) by mouth 2 times daily, Disp: 20 tablet, Rfl: 1     sulfamethoxazole-trimethoprim (BACTRIM DS) 800-160 MG per tablet, Take 1 tablet by mouth 2 times daily, Disp: 20 tablet, Rfl: 0     traZODone (DESYREL) 100 MG tablet, TAKE 1 TABLET (100 MG) BY MOUTH NIGHTLY AS NEEDED FOR SLEEP, Disp: 90 tablet, Rfl: 0     tretinoin (RETIN-A) 0.025 % cream, Apply to entire face at bedtime. Skip to every other night if too irritating., Disp: 45 g, Rfl: 11     triamcinolone (KENALOG) 0.1 % ointment, Apply topically 2 times daily To itchy and red areas of the hands as needed until improved., Disp: 60 g, Rfl: 5     valACYclovir (VALTREX) 1000 mg tablet, Take 1 tablet (1,000 mg) by mouth daily, Disp: 90 tablet, Rfl: 3     VITAMIN D, CHOLECALCIFEROL, PO, , Disp: , Rfl:       Allergies:   Minocycline - swelling  Penicillins   Latex - skin reaction      Past Medical History:  Coronary artery disease  Hyperlipidemia  Hypertension  Osteopenia   Uncomplicated asthma  Benign neoplasm of choroid, left  Posterior vitreous detachment, right   Acne  Rosacea  Postinflammatory hyperpigmentation  Cold sores  Insomnia      Past Surgical History:  Angioplasty with stent placement 1998    Family History:   Asthma - mother, brother   Glaucoma - mother  Alzheimer's disease - father  Breast cancer - sister, maternal aunt x 3, maternal cousins x 3  Skin cancer - maternal aunt  Heart disease - multiple relatives in paternal family  Allergies - sister, brother, daughter     Social History:   Presents to clinic alone.  Tobacco Use: No previous or current tobacco use.   Alcohol Use: Occasional glass of wine     Physical Exam:   BP (!) 158/93   Pulse 69    Resp 16   Wt 66.7 kg (147 lb 1.6 oz)   BMI 26.90 kg/m      Repeat BP: 156/92     Constitutional: Alert. In no distress.  Head: Normocephalic.   ENT: Mucous membranes are moist. Left ear - there is a dark foreign body adjacent to the eardrum which may have actually gone into the eardrum.  There is no drainage or redness.    Respiratory: Normal rate and effort.  Musculoskeletal: No gross deformities noted.   Psychiatric: Mentation appears normal. Normal affect.        Assessment and Plan:  Postmenopausal status  - Dexa hip/pelvis/spine*    Essential hypertension with goal blood pressure less than 140/9  - hydrochlorothiazide (HYDRODIURIL) 50 MG tablet  Dispense: 90 tablet; Refill: 3    Hyperlipidemia LDL goal <70  - hydrochlorothiazide (HYDRODIURIL) 50 MG tablet  Dispense: 90 tablet; Refill: 3  - pravastatin (PRAVACHOL) 80 MG tablet  Dispense: 90 tablet; Refill: 3  - Lipid panel reflex to direct LDL Fasting  - Comprehensive metabolic panel    Routine general medical examination at a health care facility  - hydrochlorothiazide (HYDRODIURIL) 50 MG tablet  Dispense: 90 tablet; Refill: 3  - pravastatin (PRAVACHOL) 80 MG tablet  Dispense: 90 tablet; Refill: 3    Hypertension, unspecified type    Essential hypertension  Will increase her Lisinopril dose.  She will let us know if her blood pressure does not reduce.  I have also instructed her to lose the 15 pounds she has put on in the last couple of years.  - pravastatin (PRAVACHOL) 80 MG tablet  Dispense: 90 tablet; Refill: 3  - lisinopril (PRINIVIL/ZESTRIL) 20 MG tablet  Dispense: 90 tablet; Refill: 3    Other insomnia  Trazodone causes upset stomach.  She has lifelong insomnia.  - doxepin (SINEQUAN) 10 MG capsule  Dispense: 90 capsule; Refill: 3    Acute foreign body of left ear, initial encounter  - OTOLARYNGOLOGY REFERRAL       Follow-up: Return in about 1 year (around 2/2/2020).         Scribe Disclosure:   I, Bettina Teran, am serving as a scribe to document services  personally performed by Krunal Oconnell MD at this visit, based upon the provider's statements to me. All documentation has been reviewed by the aforementioned provider prior to being entered into the official medical record.     Portions of this medical record were completed by a scribe. UPON MY REVIEW AND AUTHENTICATION BY ELECTRONIC SIGNATURE, this confirms (a) I performed the applicable clinical services, and (b) the record is accurate.   Krunal Oconnell MD

## 2019-02-02 NOTE — NURSING NOTE
Chief Complaint   Patient presents with     Recheck Medication     pt is here for a medication follow up       Radha Erwin CMA at 11:16 AM on 2/2/2019

## 2019-02-03 DIAGNOSIS — G47.09 OTHER INSOMNIA: ICD-10-CM

## 2019-02-05 RX ORDER — TRAZODONE HYDROCHLORIDE 100 MG/1
100 TABLET ORAL
Qty: 90 TABLET | Refills: 1 | OUTPATIENT
Start: 2019-02-05

## 2019-02-05 NOTE — TELEPHONE ENCOUNTER
Per Dr Oconnell: 2/2/2019:Trazodone causes upset stomach.  She has lifelong insomnia.  - doxepin (SINEQUAN) 10 MG capsule  Dispense: 90 capsule; Refill: 3.

## 2019-02-12 ENCOUNTER — TELEPHONE (OUTPATIENT)
Dept: OTOLARYNGOLOGY | Facility: CLINIC | Age: 74
End: 2019-02-12

## 2019-02-12 NOTE — TELEPHONE ENCOUNTER
[facility] Call Center    Phone Message    May a detailed message be left on voicemail: yes    Reason for Call: Other: Pt had been referred to ENT by PCP on 2/2 for a foreign body in her left ear and some pressure/ringing. A clinic coordinator contacted the call center to ask the status of this; pt had informed coordinator that she was told someone from ENT was to reach out. There was no documentation of this in chart.  Writer spoke to nursing in ENT, who offered a spot with Dr. Yañez 2/13 with a WIN prior. Writer called pt and offered this time, adding that nursing urged pt to be seen due to the emergent situation. Pt refused this appt time, stating she would be available Friday instead. Please advise; this particular dx is not on call center protocols.    Action Taken: Message routed to:  [facility] ([facility]

## 2019-02-13 DIAGNOSIS — H91.90 HEARING LOSS, UNSPECIFIED HEARING LOSS TYPE, UNSPECIFIED LATERALITY: Primary | ICD-10-CM

## 2019-02-13 NOTE — TELEPHONE ENCOUNTER
Soon mi do you know anyone in ENT to get her in? I saw her it looked like a small foreign body was lodged deep in left ear canal. Ideally be seen soon.

## 2019-02-13 NOTE — TELEPHONE ENCOUNTER
FUTURE VISIT INFORMATION      FUTURE VISIT INFORMATION:    Date: 2/12/19    Time: 9AM (audio)/ 10AM (ENT)     Location: [facility]  REFERRAL INFORMATION:    Referring provider:  Krunal Oconnell MD    Referring providers clinic:  [facility]    Reason for visit/diagnosis  Acute foreign body of left ear, initial encounter     RECORDS REQUESTED FROM:       Clinic name Comments Records Status Imaging Status   [facility] 2/2/19 notes with Dr Oconnell  EPIC

## 2019-02-15 ENCOUNTER — PRE VISIT (OUTPATIENT)
Dept: OTOLARYNGOLOGY | Facility: CLINIC | Age: 74
End: 2019-02-15

## 2019-02-15 ENCOUNTER — OFFICE VISIT (OUTPATIENT)
Dept: OTOLARYNGOLOGY | Facility: CLINIC | Age: 74
End: 2019-02-15
Payer: MEDICARE

## 2019-02-15 ENCOUNTER — OFFICE VISIT (OUTPATIENT)
Dept: AUDIOLOGY | Facility: CLINIC | Age: 74
End: 2019-02-15
Payer: MEDICARE

## 2019-02-15 DIAGNOSIS — H90.5 SENSORINEURAL HEARING LOSS OF LEFT EAR: Primary | ICD-10-CM

## 2019-02-15 DIAGNOSIS — H61.22 CERUMEN DEBRIS ON TYMPANIC MEMBRANE OF LEFT EAR: Primary | ICD-10-CM

## 2019-02-15 DIAGNOSIS — H91.90 HEARING LOSS, UNSPECIFIED HEARING LOSS TYPE, UNSPECIFIED LATERALITY: ICD-10-CM

## 2019-02-15 DIAGNOSIS — H90.A31 MIXED CONDUCTIVE AND SENSORINEURAL HEARING LOSS OF RIGHT EAR WITH RESTRICTED HEARING OF LEFT EAR: ICD-10-CM

## 2019-02-15 RX ORDER — TRAZODONE HYDROCHLORIDE 100 MG/1
TABLET ORAL
Refills: 0 | COMMUNITY
Start: 2018-12-17 | End: 2020-09-08

## 2019-02-15 ASSESSMENT — PAIN SCALES - GENERAL: PAINLEVEL: NO PAIN (0)

## 2019-02-15 NOTE — LETTER
"  RE: Kassy Casper  [address] [place] [address]     Dear Colleague,    Thank you for referring your patient, Kassy Casper, to the [facility] E[facility] at [facility]. Please see a copy of my visit note below.    [facility]  OTOLARYNGOLOGY    Dear Dr. Luo:    I had the pleasure of meeting Kassy Casper in consultation today at the [facility] at your request.     History of Present Illness:   Kassy Casper is a 73 year old female who presents for an evaluation of possible foreign body in the left ear. Patient was seeing her primary care physician, Dr. Oconnell, for a medication re-check on 2/2/19 and requested that he examined her ear. Per his note, he saw a foreign body adjacent to the eardrum that he was concerned had perforated it. She is here today for an evaluation.  Patient states that she has noted some intermittent sounds of \"whooshing wind\" in the left ear after she was cleaning brush from her yard back in November. She states that she thinks a branch might of potentially fallen into the left ear canal as she noted a short sharp pain in the ear.  Pain has since resolved but the whooshing sound that is what bothers her now.  She also notes some distorted hearing and minimal dizziness which she describes as an imbalance.  She denies any otalgia, otorrhea, aural fullness, pressure, and headache.    ALLERGIES:  Allergies   Allergen Reactions     Latex Other (See Comments)     Local skin reaction     Minocycline Hcl Swelling     Penicillin [Penicillins] Other (See Comments)     Asthma/Allergies a child so was told not to take         MEDICATIONS:  Current Outpatient Medications   Medication Sig Dispense Refill     acyclovir (ZOVIRAX) 5 % ointment Apply topically 5 times daily To affected area at lower lip until resolved. 30 g 11     albuterol (PROAIR HFA, PROVENTIL HFA, VENTOLIN HFA) 108 (90 BASE) " MCG/ACT inhaler Inhale 2 puffs into the lungs every 6 hours 1 Inhaler 3     ALPRAZolam (XANAX) 0.5 MG tablet Take 1 tablet (0.5 mg) by mouth nightly as needed for anxiety 30 tablet 0     aspirin 81 MG tablet Take 1 tablet by mouth daily.       ciprofloxacin (CIPRO) 500 MG tablet Take 1 tablet (500 mg) by mouth 2 times daily 10 tablet 0     clindamycin (CLEOCIN T) 1 % lotion Apply to entire face twice daily until improved. 60 mL 11     desonide (DESOWEN) 0.05 % ointment Apply topically 2 times daily To itchy rash and red areas of face until improved. Avoid eyes. 60 g 1     doxepin (SINEQUAN) 10 MG capsule Take 1 capsule (10 mg) by mouth At Bedtime 90 capsule 3     doxycycline monohydrate 100 MG capsule Take 100 mg daily. Take with a full glass of water. Avoid taking immediately before bed. 90 capsule 1     EPINEPHrine (EPIPEN) 0.3 MG/0.3ML injection Inject 0.3 mLs (0.3 mg) into the muscle once as needed for anaphylaxis 2 each 3     fluticasone (FLONASE) 50 MCG/ACT nasal spray Spray 2 sprays into both nostrils daily 1 Package 3     hydrochlorothiazide (HYDRODIURIL) 50 MG tablet Take 1 tablet (50 mg) by mouth daily 90 tablet 3     lisinopril (PRINIVIL/ZESTRIL) 10 MG tablet TAKE 1 TABLET (10 MG) BY MOUTH DAILY 90 tablet 0     lisinopril (PRINIVIL/ZESTRIL) 20 MG tablet Take 1 tablet (20 mg) by mouth daily 90 tablet 3     loratadine (CLARITIN) 10 MG tablet Take 1 tablet (10 mg) by mouth daily as needed for allergies (for hives) 30 tablet 6     Multiple Vitamin (MULTIVITAMIN  S) CAPS Take 1 tablet by mouth daily.       neomycin-polymyxin-dexamethasone (MAXITROL) 3.5-00093-6.1 OINT ophthalmic ointment Place 1 Application into the right eye 2 times daily 1 Tube 1     pimecrolimus (ELIDEL) 1 % cream Apply topically 2 times daily To redness on the face 60 g 11     pravastatin (PRAVACHOL) 80 MG tablet Take 1 tablet (80 mg) by mouth daily 90 tablet 3     predniSONE (DELTASONE) 20 MG tablet Take 1 tablet (20 mg) by mouth 2 times  daily 20 tablet 1     sulfamethoxazole-trimethoprim (BACTRIM DS) 800-160 MG per tablet Take 1 tablet by mouth 2 times daily 20 tablet 0     traZODone (DESYREL) 100 MG tablet TAKE 1 TABLET (100 MG) BY MOUTH NIGHTLY AS NEEDED FOR SLEEP  0     tretinoin (RETIN-A) 0.025 % cream Apply to entire face at bedtime. Skip to every other night if too irritating. 45 g 11     triamcinolone (KENALOG) 0.1 % ointment Apply topically 2 times daily To itchy and red areas of the hands as needed until improved. 60 g 5     valACYclovir (VALTREX) 1000 mg tablet Take 1 tablet (1,000 mg) by mouth daily 90 tablet 3     valACYclovir (VALTREX) 1000 mg tablet Take 1 tablet (1,000 mg) by mouth 2 times daily for 5 days For cold sores. 10 tablet 11     VITAMIN D, CHOLECALCIFEROL, PO          PAST MEDICAL HISTORY:  Past Medical History:   Diagnosis Date     Benign neoplasm of choroid     LE     CAD (coronary artery disease)      Coronary atherosclerosis of unspecified type of vessel, native or graft     s/p stint 1998     Disorder of bone and cartilage, unspecified     osteopenia on DEXA 2005     Hyperlipidemia LDL goal < 70      Hypertension      Insomnia, unspecified      Other and unspecified hyperlipidemia      PVD (posterior vitreous detachment), right eye      Uncomplicated asthma      Unspecified essential hypertension        PAST SURGICAL HISTORY:  History of tonsillectomy as a child  Past Surgical History:   Procedure Laterality Date     ANGIOPLASTY  1998     COLONOSCOPY  2006       SOCIAL HISTORY:  Social History     Socioeconomic History     Marital status:      Spouse name: Not on file     Number of children: Not on file     Years of education: Not on file     Highest education level: Not on file   Social Needs     Financial resource strain: Not on file     Food insecurity - worry: Not on file     Food insecurity - inability: Not on file     Transportation needs - medical: Not on file     Transportation needs - non-medical: Not  on file   Occupational History     Not on file   Tobacco Use     Smoking status: Never Smoker     Smokeless tobacco: Never Used   Substance and Sexual Activity     Alcohol use: Yes     Comment: occasional glass wine     Drug use: No     Sexual activity: Yes     Partners: Male   Other Topics Concern     Parent/sibling w/ CABG, MI or angioplasty before 65F 55M? Not Asked      Service Not Asked     Blood Transfusions Not Asked     Caffeine Concern No     Occupational Exposure Not Asked     Hobby Hazards Not Asked     Sleep Concern Yes     Comment: relates to cholesterol meds     Stress Concern Not Asked     Weight Concern Not Asked     Special Diet Not Asked     Back Care Not Asked     Exercise Yes     Comment: treadmill     Bike Helmet Not Asked     Seat Belt Yes     Self-Exams Not Asked   Social History Narrative    Caffeine intake/servings daily - 0Calcium intake/servings daily - 3 plus suppExercise 5  times weekly - describe walkingSunscreen used - YesSeatbelts used - YesGuns stored in the home - NoSelf Breast Exam - Yes not as often as I shouldPap test up to date -  YesEye exam up to date -  NoDental exam up to date -  YesDEXA scan up to date -  Yes 8/31/05Flex Sig/Colonoscopy up to date -  Yes 2005 or 2006Mammography up to date -  Yes 10/16/2006Immunizations reviewed and up to date - YesAbuse: Current or Past (Physical, Sexual or Emotional) - NoDo you feel safe in your environment - YesDo you cope well with stress - YesDo you suffer from insomnia - YesLast updated by: Kerry Waddell  12/17/2007        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  , has a partner, has a daughter        FAMILY HISTORY:  Family History   Problem Relation Age of Onset     Asthma Mother      Allergies Mother         hx urticaria     Eye Disorder Mother         Glauoma     Cerebrovascular Disease Mother      Alzheimer Disease Father      Breast Cancer Sister      Eye Disorder Maternal Aunt          x 2     Skin Cancer Maternal Aunt      Heart Disease Other         Numerous extended paternal family     Skin Cancer Other      Melanoma Other      Asthma Sister      Asthma Brother      Breast Cancer Maternal Aunt      Breast Cancer Maternal Aunt      Breast Cancer Maternal Aunt      Breast Cancer Other         Maternal cousins x3     Allergies Sister      Allergies Brother      Allergies Daughter         hx recurrent urticaria     Heart Disease Other      Cerebrovascular Disease Sister      Cancer No family hx of         No family hx skin cancer       PHYSICAL EXAMINATION:    There were no vitals taken for this visit.  Constitutional:  The patient was unaccompanied, well-groomed, and in no acute distress.     Skin: Normal:  warm and pink without rash   Neurologic: Alert and oriented x 3. House Brackmann grade I/VI bilaterally.   Psychiatric: The patient's affect was calm, cooperative, and appropriate.     Communication:  Normal; communicates verbally, normal voice quality.   Respiratory: Breathing comfortably without stridor or exertion of accessory muscles.    Head/Face:  Normocephalic and atraumatic.  No lesions or scars. No sinus tenderness.     Eyes: Pupils were equal and reactive.  Extraocular movement intact.     Ears: Pinnae and tragus non-tender. Right EAC clear with TM intact with normal ossicular landmarks and a well aerated middle ears space. On the left, there is some dried blood sitting against the TM and resting along the inferior aspect of the canal.  The left ear was examined under the microscope.  Using a right angle hook and alligator forceps I removed the debris.   Nose: Sinuses were non-tender.  Anterior rhinoscopy revealed midline septum and absence of purulence or polyps.     Oral Cavity: Normal tongue, floor of mouth, buccal mucosa, and palate.  No lesions or masses on inspection.     Oropharynx: Normal mucosa, palate symmetric with normal elevation. No abnormal lymph tissue in the  oropharynx.     Neck: Supple with normal laryngeal and tracheal landmarks.  The parotid beds were without masses.  No palpable thyroid.  Normal range of motion   Lymphatic: There is no palpable lymphadenopathy in the neck.      AUDIOGRAM:  Audiogram today shows right normal sloping to moderate predominantly sensorineural hearing loss in the higher frequencies.  On the left shows normal sloping to primarily mild sensorineural hearing loss in the higher frequencies. Word recognition score 100% bilaterally.  Tympanogram type A bilaterally.    ASSESSMENT AND PLAN:  Kassy Casper is a 73 year old female with dried bloody debris in the left ear canal.  From her history what I can theorizes what happened was that the brush she was carrying could have lacerated the ear canal back in November and the blood from that injury dried and fell onto the TM. I removed this today in clinic.  Patient noted immediate improvement of her symptoms.  She may return as needed.    Thank you very much for the opportunity to participate in the care of your patient.      Cindy Sanders PA-C  Otolaryngology - Head & Neck Surgery  [phone]

## 2019-02-15 NOTE — PROGRESS NOTES
AUDIOLOGY REPORT    SUMMARY: Audiology visit completed. See audiogram for results.      RECOMMENDATIONS: Follow-up with ENT.        Alondra Johnson, CCC-A  Licensed Audiologist  [place] #[id number]

## 2019-02-15 NOTE — PROGRESS NOTES
"[facility]  OTOLARYNGOLOGY    Dear  Self:    I had the pleasure of meeting Kassy Casper in consultation today at the [facility] at your request.     History of Present Illness:   Kassy Casper is a 73 year old female who presents for an evaluation of possible foreign body in the left ear. Patient was seeing her primary care physician, Dr. Oconnell, for a medication re-check on 2/2/19 and requested that he examined her ear. Per his note, he saw a foreign body adjacent to the eardrum that he was concerned had perforated it. She is here today for an evaluation.  Patient states that she has noted some intermittent sounds of \"whooshing wind\" in the left ear after she was cleaning brush from her yard back in November. She states that she thinks a branch might of potentially fallen into the left ear canal as she noted a short sharp pain in the ear.  Pain has since resolved but the whooshing sound that is what bothers her now.  She also notes some distorted hearing and minimal dizziness which she describes as an imbalance.  She denies any otalgia, otorrhea, aural fullness, pressure, and headache.    REVIEW OF SYSTEMS:   ENT ROS 2/15/2019   Ears, Nose, Throat Hearing loss, Ringing/noise in ears     10 point ROS neg other than the symptoms noted above in the HPI.    ALLERGIES:  Allergies   Allergen Reactions     Latex Other (See Comments)     Local skin reaction     Minocycline Hcl Swelling     Penicillin [Penicillins] Other (See Comments)     Asthma/Allergies a child so was told not to take         MEDICATIONS:  Current Outpatient Medications   Medication Sig Dispense Refill     acyclovir (ZOVIRAX) 5 % ointment Apply topically 5 times daily To affected area at lower lip until resolved. 30 g 11     albuterol (PROAIR HFA, PROVENTIL HFA, VENTOLIN HFA) 108 (90 BASE) MCG/ACT inhaler Inhale 2 puffs into the lungs every 6 hours 1 Inhaler 3     ALPRAZolam (XANAX) 0.5 MG tablet " Take 1 tablet (0.5 mg) by mouth nightly as needed for anxiety 30 tablet 0     aspirin 81 MG tablet Take 1 tablet by mouth daily.       ciprofloxacin (CIPRO) 500 MG tablet Take 1 tablet (500 mg) by mouth 2 times daily 10 tablet 0     clindamycin (CLEOCIN T) 1 % lotion Apply to entire face twice daily until improved. 60 mL 11     desonide (DESOWEN) 0.05 % ointment Apply topically 2 times daily To itchy rash and red areas of face until improved. Avoid eyes. 60 g 1     doxepin (SINEQUAN) 10 MG capsule Take 1 capsule (10 mg) by mouth At Bedtime 90 capsule 3     doxycycline monohydrate 100 MG capsule Take 100 mg daily. Take with a full glass of water. Avoid taking immediately before bed. 90 capsule 1     EPINEPHrine (EPIPEN) 0.3 MG/0.3ML injection Inject 0.3 mLs (0.3 mg) into the muscle once as needed for anaphylaxis 2 each 3     fluticasone (FLONASE) 50 MCG/ACT nasal spray Spray 2 sprays into both nostrils daily 1 Package 3     hydrochlorothiazide (HYDRODIURIL) 50 MG tablet Take 1 tablet (50 mg) by mouth daily 90 tablet 3     lisinopril (PRINIVIL/ZESTRIL) 10 MG tablet TAKE 1 TABLET (10 MG) BY MOUTH DAILY 90 tablet 0     lisinopril (PRINIVIL/ZESTRIL) 20 MG tablet Take 1 tablet (20 mg) by mouth daily 90 tablet 3     loratadine (CLARITIN) 10 MG tablet Take 1 tablet (10 mg) by mouth daily as needed for allergies (for hives) 30 tablet 6     Multiple Vitamin (MULTIVITAMIN  S) CAPS Take 1 tablet by mouth daily.       neomycin-polymyxin-dexamethasone (MAXITROL) 3.5-34959-0.1 OINT ophthalmic ointment Place 1 Application into the right eye 2 times daily 1 Tube 1     pimecrolimus (ELIDEL) 1 % cream Apply topically 2 times daily To redness on the face 60 g 11     pravastatin (PRAVACHOL) 80 MG tablet Take 1 tablet (80 mg) by mouth daily 90 tablet 3     predniSONE (DELTASONE) 20 MG tablet Take 1 tablet (20 mg) by mouth 2 times daily 20 tablet 1     sulfamethoxazole-trimethoprim (BACTRIM DS) 800-160 MG per tablet Take 1 tablet by mouth  2 times daily 20 tablet 0     traZODone (DESYREL) 100 MG tablet TAKE 1 TABLET (100 MG) BY MOUTH NIGHTLY AS NEEDED FOR SLEEP  0     tretinoin (RETIN-A) 0.025 % cream Apply to entire face at bedtime. Skip to every other night if too irritating. 45 g 11     triamcinolone (KENALOG) 0.1 % ointment Apply topically 2 times daily To itchy and red areas of the hands as needed until improved. 60 g 5     valACYclovir (VALTREX) 1000 mg tablet Take 1 tablet (1,000 mg) by mouth daily 90 tablet 3     valACYclovir (VALTREX) 1000 mg tablet Take 1 tablet (1,000 mg) by mouth 2 times daily for 5 days For cold sores. 10 tablet 11     VITAMIN D, CHOLECALCIFEROL, PO          PAST MEDICAL HISTORY:  Past Medical History:   Diagnosis Date     Benign neoplasm of choroid     LE     CAD (coronary artery disease)      Coronary atherosclerosis of unspecified type of vessel, native or graft     s/p stint 1998     Disorder of bone and cartilage, unspecified     osteopenia on DEXA 2005     Hyperlipidemia LDL goal < 70      Hypertension      Insomnia, unspecified      Other and unspecified hyperlipidemia      PVD (posterior vitreous detachment), right eye      Uncomplicated asthma      Unspecified essential hypertension        PAST SURGICAL HISTORY:  History of tonsillectomy as a child  Past Surgical History:   Procedure Laterality Date     ANGIOPLASTY  1998     COLONOSCOPY  2006       SOCIAL HISTORY:  Social History     Socioeconomic History     Marital status:      Spouse name: Not on file     Number of children: Not on file     Years of education: Not on file     Highest education level: Not on file   Social Needs     Financial resource strain: Not on file     Food insecurity - worry: Not on file     Food insecurity - inability: Not on file     Transportation needs - medical: Not on file     Transportation needs - non-medical: Not on file   Occupational History     Not on file   Tobacco Use     Smoking status: Never Smoker     Smokeless  tobacco: Never Used   Substance and Sexual Activity     Alcohol use: Yes     Comment: occasional glass wine     Drug use: No     Sexual activity: Yes     Partners: Male   Other Topics Concern     Parent/sibling w/ CABG, MI or angioplasty before 65F 55M? Not Asked      Service Not Asked     Blood Transfusions Not Asked     Caffeine Concern No     Occupational Exposure Not Asked     Hobby Hazards Not Asked     Sleep Concern Yes     Comment: relates to cholesterol meds     Stress Concern Not Asked     Weight Concern Not Asked     Special Diet Not Asked     Back Care Not Asked     Exercise Yes     Comment: treadmill     Bike Helmet Not Asked     Seat Belt Yes     Self-Exams Not Asked   Social History Narrative    Caffeine intake/servings daily - 0Calcium intake/servings daily - 3 plus suppExercise 5  times weekly - describe walkingSunscreen used - YesSeatbelts used - YesGuns stored in the home - NoSelf Breast Exam - Yes not as often as I shouldPap test up to date -  YesEye exam up to date -  NoDental exam up to date -  YesDEXA scan up to date -  Yes 8/31/05Flex Sig/Colonoscopy up to date -  Yes 2005 or 2006Mammography up to date -  Yes 10/16/2006Immunizations reviewed and up to date - YesAbuse: Current or Past (Physical, Sexual or Emotional) - NoDo you feel safe in your environment - YesDo you cope well with stress - YesDo you suffer from insomnia - YesLast updated by: Kerry Waddell  12/17/2007        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  , has a partner, has a daughter        FAMILY HISTORY:  Family History   Problem Relation Age of Onset     Asthma Mother      Allergies Mother         hx urticaria     Eye Disorder Mother         Glauoma     Cerebrovascular Disease Mother      Alzheimer Disease Father      Breast Cancer Sister      Eye Disorder Maternal Aunt         x 2     Skin Cancer Maternal Aunt      Heart Disease Other         Numerous extended paternal family      Skin Cancer Other      Melanoma Other      Asthma Sister      Asthma Brother      Breast Cancer Maternal Aunt      Breast Cancer Maternal Aunt      Breast Cancer Maternal Aunt      Breast Cancer Other         Maternal cousins x3     Allergies Sister      Allergies Brother      Allergies Daughter         hx recurrent urticaria     Heart Disease Other      Cerebrovascular Disease Sister      Cancer No family hx of         No family hx skin cancer       PHYSICAL EXAMINATION:    There were no vitals taken for this visit.  Constitutional:  The patient was unaccompanied, well-groomed, and in no acute distress.     Skin: Normal:  warm and pink without rash   Neurologic: Alert and oriented x 3. House Brackmann grade I/VI bilaterally.   Psychiatric: The patient's affect was calm, cooperative, and appropriate.     Communication:  Normal; communicates verbally, normal voice quality.   Respiratory: Breathing comfortably without stridor or exertion of accessory muscles.    Head/Face:  Normocephalic and atraumatic.  No lesions or scars. No sinus tenderness.     Eyes: Pupils were equal and reactive.  Extraocular movement intact.     Ears: Pinnae and tragus non-tender. Right EAC clear with TM intact with normal ossicular landmarks and a well aerated middle ears space. On the left, there is some dried blood sitting against the TM and resting along the inferior aspect of the canal.  The left ear was examined under the microscope.  Using a right angle hook and alligator forceps I removed the debris.   Nose: Sinuses were non-tender.  Anterior rhinoscopy revealed midline septum and absence of purulence or polyps.     Oral Cavity: Normal tongue, floor of mouth, buccal mucosa, and palate.  No lesions or masses on inspection.     Oropharynx: Normal mucosa, palate symmetric with normal elevation. No abnormal lymph tissue in the oropharynx.     Neck: Supple with normal laryngeal and tracheal landmarks.  The parotid beds were without  masses.  No palpable thyroid.  Normal range of motion   Lymphatic: There is no palpable lymphadenopathy in the neck.      AUDIOGRAM:  Audiogram today shows right normal sloping to moderate predominantly sensorineural hearing loss in the higher frequencies.  On the left shows normal sloping to primarily mild sensorineural hearing loss in the higher frequencies. Word recognition score 100% bilaterally.  Tympanogram type A bilaterally.    ASSESSMENT AND PLAN:  Kassy Casper is a 73 year old female with dried bloody debris in the left ear canal.  From her history what I can theorizes what happened was that the brush she was carrying could have lacerated the ear canal back in November and the blood from that injury dried and fell onto the TM. I removed this today in clinic.  Patient noted immediate improvement of her symptoms.  She may return as needed.    Thank you very much for the opportunity to participate in the care of your patient.      Cindy Sanders PA-C  Otolaryngology - Head & Neck Surgery  [phone]

## 2019-03-02 DIAGNOSIS — F41.9 ANXIETY: ICD-10-CM

## 2019-03-05 RX ORDER — ALPRAZOLAM 0.5 MG
TABLET ORAL
Qty: 30 TABLET | Refills: 0 | Status: SHIPPED | OUTPATIENT
Start: 2019-03-05 | End: 2019-05-15

## 2019-03-17 DIAGNOSIS — G47.09 OTHER INSOMNIA: ICD-10-CM

## 2019-03-18 DIAGNOSIS — E78.5 HYPERLIPIDEMIA LDL GOAL <70: ICD-10-CM

## 2019-03-18 DIAGNOSIS — Z00.00 ROUTINE GENERAL MEDICAL EXAMINATION AT A HEALTH CARE FACILITY: ICD-10-CM

## 2019-03-18 DIAGNOSIS — I10 HYPERTENSION, UNSPECIFIED TYPE: ICD-10-CM

## 2019-03-18 RX ORDER — TRAZODONE HYDROCHLORIDE 100 MG/1
100 TABLET ORAL
Qty: 90 TABLET | Refills: 0 | OUTPATIENT
Start: 2019-03-18

## 2019-03-19 RX ORDER — LISINOPRIL 10 MG/1
TABLET ORAL
Qty: 90 TABLET | Refills: 0 | OUTPATIENT
Start: 2019-03-19

## 2019-04-11 DIAGNOSIS — I10 ESSENTIAL HYPERTENSION: ICD-10-CM

## 2019-04-11 DIAGNOSIS — Z00.00 ROUTINE GENERAL MEDICAL EXAMINATION AT A HEALTH CARE FACILITY: ICD-10-CM

## 2019-04-11 DIAGNOSIS — I10 ESSENTIAL HYPERTENSION WITH GOAL BLOOD PRESSURE LESS THAN 140/90: ICD-10-CM

## 2019-04-11 DIAGNOSIS — E78.5 HYPERLIPIDEMIA LDL GOAL <70: ICD-10-CM

## 2019-04-15 RX ORDER — PRAVASTATIN SODIUM 80 MG/1
80 TABLET ORAL DAILY
Qty: 90 TABLET | Refills: 0 | OUTPATIENT
Start: 2019-04-15

## 2019-04-15 RX ORDER — HYDROCHLOROTHIAZIDE 50 MG/1
50 TABLET ORAL DAILY
Qty: 90 TABLET | Refills: 0 | OUTPATIENT
Start: 2019-04-15

## 2019-05-15 DIAGNOSIS — F41.9 ANXIETY: ICD-10-CM

## 2019-05-16 RX ORDER — ALPRAZOLAM 0.5 MG
TABLET ORAL
Qty: 30 TABLET | Refills: 0 | Status: SHIPPED | OUTPATIENT
Start: 2019-05-16 | End: 2019-08-03

## 2019-08-03 DIAGNOSIS — F41.9 ANXIETY: ICD-10-CM

## 2019-08-05 RX ORDER — ALPRAZOLAM 0.5 MG
TABLET ORAL
Qty: 30 TABLET | Refills: 0 | Status: SHIPPED | OUTPATIENT
Start: 2019-08-05 | End: 2019-12-05

## 2019-08-05 NOTE — TELEPHONE ENCOUNTER
RX for Xanax faxed to [facility] pharmacy in [place].    DADA CLAYTON at 1:15 PM on 8/5/2019.          Patient Requested  ALPRAZolam (XANAX) 0.5 MG tablet  Last Filled  05/16/2019  Last Office Visit  02/02/2019  Next Office Visit  02/06/2020   Checked  08/05/2019    DX: Anxiety    Pharmacy: [facility] [id number] [place] [id number]    DADA CLAYTON at 7:50 AM on 8/5/2019.

## 2019-11-08 ENCOUNTER — HEALTH MAINTENANCE LETTER (OUTPATIENT)
Age: 74
End: 2019-11-08

## 2019-12-05 DIAGNOSIS — F41.9 ANXIETY: ICD-10-CM

## 2019-12-06 RX ORDER — ALPRAZOLAM 0.5 MG
TABLET ORAL
Qty: 30 TABLET | Refills: 0 | Status: SHIPPED | OUTPATIENT
Start: 2019-12-06 | End: 2020-04-06

## 2019-12-06 NOTE — TELEPHONE ENCOUNTER
Patient Requested  ALPRAZolam (XANAX) 0.5 MG tablet  Last Filled  08/05/2019  Last Office Visit  02/02/2019  Next Office Visit  02/06/2020   Checked  12/06/2019    DX: Anxiety    Pharmacy: [facility] [id number] [place] [id number]    DADA CLAYTON CMA at 8:40 AM on 12/6/2019.

## 2019-12-11 DIAGNOSIS — B00.9 HERPES VIRUS DISEASE: ICD-10-CM

## 2019-12-12 RX ORDER — VALACYCLOVIR HYDROCHLORIDE 1 G/1
1000 TABLET, FILM COATED ORAL DAILY
Qty: 90 TABLET | Refills: 3 | OUTPATIENT
Start: 2019-12-12

## 2019-12-18 ENCOUNTER — TELEPHONE (OUTPATIENT)
Dept: DERMATOLOGY | Facility: CLINIC | Age: 74
End: 2019-12-18

## 2020-01-30 ENCOUNTER — PRE VISIT (OUTPATIENT)
Dept: FAMILY MEDICINE | Facility: CLINIC | Age: 75
End: 2020-01-30

## 2020-01-30 NOTE — TELEPHONE ENCOUNTER
[facility]  Dept: [phone]     Patient: Kassy Casper   : 1945  MRN: [id number]  Encounter: 20       Pre Visit Assessment    Health Maintenance Due   Topic Date Due     HEPATITIS C SCREENING  1945     ADVANCE CARE PLANNING  1945     ZOSTER IMMUNIZATION (2 of 3) 01/10/2013     MEDICARE ANNUAL WELLNESS VISIT  2015     MAMMO SCREENING  2019     INFLUENZA VACCINE (1) 2019     PHQ-2  2020     FALL RISK ASSESSMENT  2020     Chart Reviewed for Labs needed/Health Maintenance: Yes   If labs needed, orders placed:  Yes Fasting Lipid,  CMP  Lab appointment scheduled:  N/A    Notes: labs ordered as future, sent my chart message to schedule lab  Patient confirmed they will attend appointment:  N/A  Appointment rescheduled?  N/A      Carina Corona at 4:33 PM on 2020

## 2020-02-06 ENCOUNTER — OFFICE VISIT (OUTPATIENT)
Dept: FAMILY MEDICINE | Facility: CLINIC | Age: 75
End: 2020-02-06
Payer: MEDICARE

## 2020-02-06 ENCOUNTER — ANCILLARY PROCEDURE (OUTPATIENT)
Dept: MAMMOGRAPHY | Facility: CLINIC | Age: 75
End: 2020-02-06
Attending: FAMILY MEDICINE
Payer: MEDICARE

## 2020-02-06 ENCOUNTER — APPOINTMENT (OUTPATIENT)
Dept: LAB | Facility: CLINIC | Age: 75
End: 2020-02-06
Payer: MEDICARE

## 2020-02-06 ENCOUNTER — TELEPHONE (OUTPATIENT)
Dept: FAMILY MEDICINE | Facility: CLINIC | Age: 75
End: 2020-02-06

## 2020-02-06 VITALS
DIASTOLIC BLOOD PRESSURE: 79 MMHG | TEMPERATURE: 98 F | SYSTOLIC BLOOD PRESSURE: 125 MMHG | WEIGHT: 133.9 LBS | OXYGEN SATURATION: 98 % | BODY MASS INDEX: 24.49 KG/M2 | HEART RATE: 74 BPM

## 2020-02-06 DIAGNOSIS — I10 HYPERTENSION, UNSPECIFIED TYPE: ICD-10-CM

## 2020-02-06 DIAGNOSIS — Z00.00 ROUTINE GENERAL MEDICAL EXAMINATION AT A HEALTH CARE FACILITY: ICD-10-CM

## 2020-02-06 DIAGNOSIS — Z78.0 POSTMENOPAUSAL STATUS: ICD-10-CM

## 2020-02-06 DIAGNOSIS — L50.9 URTICARIA, UNSPECIFIED: ICD-10-CM

## 2020-02-06 DIAGNOSIS — B00.1 RECURRENT COLD SORES: ICD-10-CM

## 2020-02-06 DIAGNOSIS — Z12.31 SCREENING MAMMOGRAM, ENCOUNTER FOR: ICD-10-CM

## 2020-02-06 DIAGNOSIS — I10 ESSENTIAL HYPERTENSION WITH GOAL BLOOD PRESSURE LESS THAN 140/90: ICD-10-CM

## 2020-02-06 DIAGNOSIS — E78.5 HYPERLIPIDEMIA LDL GOAL <70: ICD-10-CM

## 2020-02-06 DIAGNOSIS — E87.6 HYPOKALEMIA: Primary | ICD-10-CM

## 2020-02-06 DIAGNOSIS — I10 ESSENTIAL HYPERTENSION: ICD-10-CM

## 2020-02-06 DIAGNOSIS — Z00.00 ROUTINE GENERAL MEDICAL EXAMINATION AT A HEALTH CARE FACILITY: Primary | ICD-10-CM

## 2020-02-06 LAB
ALBUMIN SERPL-MCNC: 4.1 G/DL (ref 3.4–5)
ALP SERPL-CCNC: 75 U/L (ref 40–150)
ALT SERPL W P-5'-P-CCNC: 28 U/L (ref 0–50)
ANION GAP SERPL CALCULATED.3IONS-SCNC: 3 MMOL/L (ref 3–14)
AST SERPL W P-5'-P-CCNC: 21 U/L (ref 0–45)
BASOPHILS # BLD AUTO: 0 10E9/L (ref 0–0.2)
BASOPHILS NFR BLD AUTO: 0.7 %
BILIRUB SERPL-MCNC: 0.7 MG/DL (ref 0.2–1.3)
BUN SERPL-MCNC: 10 MG/DL (ref 7–30)
CALCIUM SERPL-MCNC: 9.8 MG/DL (ref 8.5–10.1)
CHLORIDE SERPL-SCNC: 102 MMOL/L (ref 94–109)
CHOLEST SERPL-MCNC: 198 MG/DL
CO2 SERPL-SCNC: 34 MMOL/L (ref 20–32)
CREAT SERPL-MCNC: 0.72 MG/DL (ref 0.52–1.04)
DIFFERENTIAL METHOD BLD: NORMAL
EOSINOPHIL # BLD AUTO: 0.1 10E9/L (ref 0–0.7)
EOSINOPHIL NFR BLD AUTO: 2.4 %
ERYTHROCYTE [DISTWIDTH] IN BLOOD BY AUTOMATED COUNT: 12.3 % (ref 10–15)
GFR SERPL CREATININE-BSD FRML MDRD: 82 ML/MIN/{1.73_M2}
GLUCOSE SERPL-MCNC: 87 MG/DL (ref 70–99)
HCT VFR BLD AUTO: 45.7 % (ref 35–47)
HDLC SERPL-MCNC: 67 MG/DL
HGB BLD-MCNC: 14.5 G/DL (ref 11.7–15.7)
IMM GRANULOCYTES # BLD: 0 10E9/L (ref 0–0.4)
IMM GRANULOCYTES NFR BLD: 0.2 %
LDLC SERPL CALC-MCNC: 86 MG/DL
LYMPHOCYTES # BLD AUTO: 1.3 10E9/L (ref 0.8–5.3)
LYMPHOCYTES NFR BLD AUTO: 28.9 %
MCH RBC QN AUTO: 28.3 PG (ref 26.5–33)
MCHC RBC AUTO-ENTMCNC: 31.7 G/DL (ref 31.5–36.5)
MCV RBC AUTO: 89 FL (ref 78–100)
MONOCYTES # BLD AUTO: 0.3 10E9/L (ref 0–1.3)
MONOCYTES NFR BLD AUTO: 7.4 %
NEUTROPHILS # BLD AUTO: 2.8 10E9/L (ref 1.6–8.3)
NEUTROPHILS NFR BLD AUTO: 60.4 %
NONHDLC SERPL-MCNC: 131 MG/DL
NRBC # BLD AUTO: 0 10*3/UL
NRBC BLD AUTO-RTO: 0 /100
PLATELET # BLD AUTO: 237 10E9/L (ref 150–450)
POTASSIUM SERPL-SCNC: 3 MMOL/L (ref 3.4–5.3)
PROT SERPL-MCNC: 7.6 G/DL (ref 6.8–8.8)
RBC # BLD AUTO: 5.12 10E12/L (ref 3.8–5.2)
SODIUM SERPL-SCNC: 139 MMOL/L (ref 133–144)
TRIGL SERPL-MCNC: 225 MG/DL
WBC # BLD AUTO: 4.6 10E9/L (ref 4–11)

## 2020-02-06 RX ORDER — HYDROCHLOROTHIAZIDE 50 MG/1
50 TABLET ORAL DAILY
Qty: 90 TABLET | Refills: 3 | Status: SHIPPED | OUTPATIENT
Start: 2020-02-06 | End: 2021-04-09

## 2020-02-06 RX ORDER — POTASSIUM CHLORIDE 1500 MG/1
20 TABLET, EXTENDED RELEASE ORAL DAILY
Qty: 90 TABLET | Refills: 3 | Status: SHIPPED | OUTPATIENT
Start: 2020-02-06 | End: 2020-11-24

## 2020-02-06 RX ORDER — LISINOPRIL 10 MG/1
10 TABLET ORAL DAILY
Qty: 90 TABLET | Refills: 3 | Status: SHIPPED | OUTPATIENT
Start: 2020-02-06 | End: 2021-02-16

## 2020-02-06 RX ORDER — VALACYCLOVIR HYDROCHLORIDE 500 MG/1
500 TABLET, FILM COATED ORAL DAILY
Qty: 90 TABLET | Refills: 3 | Status: SHIPPED | OUTPATIENT
Start: 2020-02-06 | End: 2021-02-17

## 2020-02-06 RX ORDER — PRAVASTATIN SODIUM 80 MG/1
80 TABLET ORAL DAILY
Qty: 90 TABLET | Refills: 3 | Status: SHIPPED | OUTPATIENT
Start: 2020-02-06 | End: 2020-07-13

## 2020-02-06 ASSESSMENT — PAIN SCALES - GENERAL: PAINLEVEL: NO PAIN (0)

## 2020-02-06 NOTE — NURSING NOTE
Chief Complaint   Patient presents with     Medication Request     pt here to discuss medications and get refills       Carina Corona CMA at 11:49 AM on 2/6/2020.

## 2020-02-06 NOTE — PATIENT INSTRUCTIONS
Primary Care Center Medication Refill Request Information:  * Please contact your pharmacy regarding ANY request for medication refills.  ** [facility] Prescription Fax = [phone]  * Please allow 3 business days for routine medication refills.  * Please allow 5 business days for controlled substance medication refills.     Primary Care Center Test Result notification information:  *You will be notified with in 7-10 days of your appointment day regarding the results of your test.  If you are on MyChart you will be notified as soon as the provider has reviewed the results and signed off on them.    Primary Care Center: [phone]     Your health care Provider has recommended that you receive the new Shingle vaccine called Shingrix to prevent shingles for ages 50 and above. Many private insurance and Medicare Part D plans cover Shingrix. However, not all insurance carriers cover the entire cost of the Shingrix vaccine if the vaccine is administered at your primary care clinic. The clinic cannot determine your insurance benefits.  Please call your insurance carrier prior. The vaccine comes in two doses. Your second dose should be 2-6 months from your first dose.       Prior to receiving the vaccine, we recommend that you call your insurance carrier and ask them the following questions:            1. Is there a cost difference if I receive the vaccine at my doctor's office or a pharmacy?          2. Does my insurance cover the Shingrix Vaccine and administration of the vaccine?          3. What is my co-pay or deductible for the vaccine?        Please call to schedule a Nurse-Visit only at [phone].  Nurse Visit hours are available Monday, Wednesday, and Friday from 9:00 AM-11:00 AM and 1:00 PM-3:00 PM.     DEXA Screening Tool    Dexa Scan  Is the patient taking any calcium supplements? no, if yes patient must stop calcium supplements 48 hours prior to appointment.

## 2020-02-07 ENCOUNTER — ANCILLARY PROCEDURE (OUTPATIENT)
Dept: BONE DENSITY | Facility: CLINIC | Age: 75
End: 2020-02-07
Attending: FAMILY MEDICINE
Payer: MEDICARE

## 2020-02-07 DIAGNOSIS — Z78.0 POSTMENOPAUSAL STATUS: ICD-10-CM

## 2020-02-23 ENCOUNTER — HEALTH MAINTENANCE LETTER (OUTPATIENT)
Age: 75
End: 2020-02-23

## 2020-04-01 DIAGNOSIS — F41.9 ANXIETY: ICD-10-CM

## 2020-04-06 RX ORDER — ALPRAZOLAM 0.5 MG
TABLET ORAL
Qty: 30 TABLET | Refills: 0 | Status: SHIPPED | OUTPATIENT
Start: 2020-04-06 | End: 2020-07-03

## 2020-04-06 NOTE — TELEPHONE ENCOUNTER
Patient Requested  ALPRAZolam (XANAX) 0.5 MG tablet  Last Filled  12/06/2019  Last Office Visit  02/06/2020  Next Office Visit  Nothing scheduled   Checked  04/06/2020    DX: Anxiety    Ph[facility] [id number] [place] [id number]    DADA CLAYTON CMA at 11:18 AM on 4/6/2020.

## 2020-06-10 NOTE — TELEPHONE ENCOUNTER
RECORDS RECEIVED FROM: Internal   DATE RECEIVED: 6-22   NOTES STATUS DETAILS   OFFICE NOTE from referring provider    N/A Self   OFFICE NOTE from other cardiologist    N/A    DISCHARGE SUMMARY from hospital    N/A    DISCHARGE REPORT from the ER   N/A    OPERATIVE REPORT    N/A    MEDICATION LIST   Internal    LABS     BMP   N/A    CBC   Internal 2-6-20   CMP   Internal 2-6-20   Lipids   Internal 2-6-20   TSH   N/A    DIAGNOSTIC PROCEDURES     EKG   N/A    Monitor Reports   N/A    IMAGING (DISC & REPORT)      Echo   N/A    Stress Tests   N/A    Cath   N/A    MRI/MRA   N/A    CT/CTA   N/A

## 2020-06-22 ENCOUNTER — PRE VISIT (OUTPATIENT)
Dept: CARDIOLOGY | Facility: CLINIC | Age: 75
End: 2020-06-22

## 2020-06-22 ENCOUNTER — VIRTUAL VISIT (OUTPATIENT)
Dept: CARDIOLOGY | Facility: CLINIC | Age: 75
End: 2020-06-22
Attending: INTERNAL MEDICINE
Payer: MEDICARE

## 2020-06-22 VITALS — WEIGHT: 121 LBS | BODY MASS INDEX: 22.26 KG/M2 | HEIGHT: 62 IN

## 2020-06-22 DIAGNOSIS — I10 ESSENTIAL HYPERTENSION: ICD-10-CM

## 2020-06-22 DIAGNOSIS — Z95.5 HISTORY OF PLACEMENT OF STENT IN LAD CORONARY ARTERY: ICD-10-CM

## 2020-06-22 DIAGNOSIS — I20.89 STABLE ANGINA PECTORIS (H): Primary | ICD-10-CM

## 2020-06-22 PROCEDURE — 99203 OFFICE O/P NEW LOW 30 MIN: CPT | Mod: 95 | Performed by: INTERNAL MEDICINE

## 2020-06-22 ASSESSMENT — PAIN SCALES - GENERAL: PAINLEVEL: NO PAIN (0)

## 2020-06-22 ASSESSMENT — MIFFLIN-ST. JEOR: SCORE: 1007.1

## 2020-06-22 NOTE — PATIENT INSTRUCTIONS
1.  Recommended CT coronary angiogram.    2.  Lab test to check kidney function few days prior to CT angiogram.

## 2020-06-22 NOTE — LETTER
6/22/2020      RE: Kassy Casper  [address] [place] [address]       Dear Colleague,    Thank you for the opportunity to participate in the care of your patient, Kassy Casper, at the [facility]. Please see a copy of my visit note below.    Kassy Casper is a 74 year old female who is being evaluated via a billable video visit.        HPI: Ms. Kassy Casper is a 74 year old  female with PMH significant for history of CAD with PCI to LAD (3.5 mm stent) 1998, HTN, hyperlipidemia, and uncomplicated asthma     The patient was last seen in cardiology in 2016.  The patient reports overall doing well until few weeks ago.  She has noticed mild chest tightness and shortness of breath with exertion which is new to her.  She reports no associated symptoms like nausea or vomiting.  The symptom is not severe enough for her to stop.  She is able to do all her ADLs without limitation.  Patient has no other cardiac symptom and denies a history PND, orthopnea, pedal edema, palpitations, lightheadedness, or syncope.    She has no history of smoking. Non DM. Report FH however sister had stent at the age of 64.     For hypertension, she is on hydrochlorothiazide 50 mg and lisinopril 10 mg.  Few days ago blood pressure was 138/78 mmHg.  Patient takes aspirin and pravastatin.  LDL is mildly elevated at 86 mg/deciliter.  Exercise stress echocardiogram from 2015 showed a structurally normal heart with no valvular disease.  Stress test was normal.  Medications, personal, family, and social history reviewed with patient and revised.    PAST MEDICAL HISTORY:  Past Medical History:   Diagnosis Date     Benign neoplasm of choroid     LE     CAD (coronary artery disease)      Coronary atherosclerosis of unspecified type of vessel, native or graft     s/p stint 1998     Disorder of bone and cartilage, unspecified     osteopenia on DEXA 2005     Hyperlipidemia LDL  goal < 70      Hypertension      Insomnia, unspecified      Other and unspecified hyperlipidemia      PVD (posterior vitreous detachment), right eye      Uncomplicated asthma      Unspecified essential hypertension        CURRENT MEDICATIONS:  Current Outpatient Medications   Medication Sig Dispense Refill     ALPRAZolam (XANAX) 0.5 MG tablet TAKE 1 TABLET BY MOUTH NIGHTLY AS NEEDED FOR ANXIETY 30 tablet 0     aspirin 81 MG tablet Take 1 tablet by mouth daily.       EPINEPHrine (EPIPEN) 0.3 MG/0.3ML injection Inject 0.3 mLs (0.3 mg) into the muscle once as needed for anaphylaxis 2 each 3     hydrochlorothiazide (HYDRODIURIL) 50 MG tablet Take 1 tablet (50 mg) by mouth daily 90 tablet 3     lisinopril (PRINIVIL/ZESTRIL) 10 MG tablet Take 1 tablet (10 mg) by mouth daily 90 tablet 3     Multiple Vitamin (MULTIVITAMIN  S) CAPS Take 1 tablet by mouth daily.       pimecrolimus (ELIDEL) 1 % cream Apply topically 2 times daily To redness on the face 60 g 11     potassium chloride ER (K-TAB) 20 MEQ CR tablet Take 1 tablet (20 mEq) by mouth daily 90 tablet 3     pravastatin (PRAVACHOL) 80 MG tablet Take 1 tablet (80 mg) by mouth daily 90 tablet 3     traZODone (DESYREL) 100 MG tablet TAKE 1 TABLET (100 MG) BY MOUTH NIGHTLY AS NEEDED FOR SLEEP  0     valACYclovir (VALTREX) 500 MG tablet Take 1 tablet (500 mg) by mouth daily For cold sore prophylaxis 90 tablet 3       PAST SURGICAL HISTORY:  Past Surgical History:   Procedure Laterality Date     ANGIOPLASTY  1998     COLONOSCOPY  2006       ALLERGIES:     Allergies   Allergen Reactions     Latex Other (See Comments)     Local skin reaction     Minocycline Hcl Swelling     Penicillin [Penicillins] Other (See Comments)     Asthma/Allergies a child so was told not to take         FAMILY HISTORY:  Family History   Problem Relation Age of Onset     Asthma Mother      Allergies Mother         hx urticaria     Eye Disorder Mother         Glauoma     Cerebrovascular Disease Mother       Alzheimer Disease Father      Breast Cancer Sister      Eye Disorder Maternal Aunt         x 2     Skin Cancer Maternal Aunt      Heart Disease Other         Numerous extended paternal family     Skin Cancer Other      Melanoma Other      Asthma Sister      Asthma Brother      Breast Cancer Maternal Aunt      Breast Cancer Maternal Aunt      Breast Cancer Maternal Aunt      Breast Cancer Other         Maternal cousins x3     Allergies Sister      Allergies Brother      Allergies Daughter         hx recurrent urticaria     Heart Disease Other      Cerebrovascular Disease Sister      Cancer No family hx of         No family hx skin cancer         SOCIAL HISTORY:  Social History     Tobacco Use     Smoking status: Never Smoker     Smokeless tobacco: Never Used   Substance Use Topics     Alcohol use: Yes     Comment: occasional glass wine     Drug use: No       ROS:   Constitutional: No fever, chills, or sweats. Weight stable.   ENT: No visual disturbance, ear ache, epistaxis, sore throat.   Cardiovascular: As per HPI.   Respiratory: No cough, hemoptysis.    GI: No nausea, vomiting, hematemesis, melena, or hematochezia.   : No hematuria.   Integument: Negative.   Psychiatric: Negative.   Hematologic:  No easy bruising, no easy bleeding.  Neuro: Negative.   Endocrinology: No significant heat or cold intolerance   Musculoskeletal: No myalgia.    Exam:  Physical Exam Elements attainable via telehealth:       Constitutional - alert and no distress    Eyes - no redness, no discharge    Respiratory - no cough, no labored breathing    Skin - no discoloration or lesions on the face or the arm.    Neurological -alert, normal speech,and affect, no tremor.     I have reviewed the labs and personally reviewed the imaging below and made my comment in the assessment and plan.    Labs:  CBC RESULTS:   Lab Results   Component Value Date    WBC 4.6 02/06/2020    RBC 5.12 02/06/2020    HGB 14.5 02/06/2020    HCT 45.7 02/06/2020    MCV  89 02/06/2020    MCH 28.3 02/06/2020    MCHC 31.7 02/06/2020    RDW 12.3 02/06/2020     02/06/2020       BMP RESULTS:  Lab Results   Component Value Date     02/06/2020    POTASSIUM 3.0 (L) 02/06/2020    CHLORIDE 102 02/06/2020    CO2 34 (H) 02/06/2020    ANIONGAP 3 02/06/2020    GLC 87 02/06/2020    BUN 10 02/06/2020    CR 0.72 02/06/2020    GFRESTIMATED 82 02/06/2020    GFRESTBLACK >90 02/06/2020    BENY 9.8 02/06/2020        Stress Echocardiogram 1/15/2015    No stress induced regional wall motion abnormalities.  Normal resting LV function with EF of approximately 60-65%; normal response   to exercise with increase to approximately 70-75%.  No ECG evidence of ischemia.  No subjective evidence of ischemia.  Limited for age functional capacity.  PatientHeight: 62 in  PatientWeight: 130 lbs  SystolicPressure: 122 mmHg  DiastolicPressure: 84 mmHg      Assessment and Plan:   Ms. Kassy Casper is a 74 year old  female with PMH significant for history of CAD with PCI to LAD 1998 (3.5 mm), HTN, hyperlipidemia, and uncomplicated asthma.    1.  Mild dyspnea on exertion and exertional chest tightness: This is new per patient over the last few weeks.  The patient reports similar symptoms prior to LAD stent in 1998.  Prior cardiology notes from 2008 reports mild disease of circumflex and RCA.  I was not able to see the cath report.  I recommended CT coronary angiogram since symptoms are new, exertional and similar to her prior coronary intervention in 1998.  Patient is agreeable to proceed.    2.  Hypertension: Appears to be slightly elevated at home despite lisinopril 10 and hydrochlorothiazide 50.  Recommended more regular home monitoring.    3.  Hyperlipidemia: Patient on pravastatin 80 mg daily.  LDL still mildly elevated at 86 from February of this year.  I will discuss switching to a different statin during my next visit.    I did not make any medication changes today.     A total of  15 minutes spent  face-to-face through video encounter today with greater than 50% of the time spent in counseling and coordinating cares of the issues above.     Please donot hesitate to contact me if you have any questions or concerns. Again, thank you for allowing me to participate in the care of your patient.    Anastacio RENTERIA MD  [facility] Division of Cardiology  Pager [phone]       Please do not hesitate to contact me if you have any questions/concerns.     Sincerely,     Anastacio Renteria MD

## 2020-06-22 NOTE — PROGRESS NOTES
"Kassy Casper is a 74 year old female who is being evaluated via a billable video visit.      The patient has been notified of following:     \"This video visit will be conducted via a call between you and your physician/provider. We have found that certain health care needs can be provided without the need for an in-person physical exam.  This service lets us provide the care you need with a video conversation.  If a prescription is necessary we can send it directly to your pharmacy.  If lab work is needed we can place an order for that and you can then stop by our lab to have the test done at a later time.    Video visits are billed at different rates depending on your insurance coverage.  Please reach out to your insurance provider with any questions.    If during the course of the call the physician/provider feels a video visit is not appropriate, you will not be charged for this service.\"    Patient has given verbal consent for Video visit? Yes    Will anyone else be joining your video visit? No            "

## 2020-06-22 NOTE — PROGRESS NOTES
"Kassy Casper is a 74 year old female who is being evaluated via a billable video visit.      The patient has been notified of following:     \"This video visit will be conducted via a call between you and your physician/provider. We have found that certain health care needs can be provided without the need for an in-person physical exam.  This service lets us provide the care you need with a video conversation.  If a prescription is necessary we can send it directly to your pharmacy.  If lab work is needed we can place an order for that and you can then stop by our lab to have the test done at a later time.    Video visits are billed at different rates depending on your insurance coverage.  Please reach out to your insurance provider with any questions.    If during the course of the call the physician/provider feels a video visit is not appropriate, you will not be charged for this service.\"    Patient has given verbal consent for Video visit? Yes    How would you like to obtain your AVS? Panda    Patient would like the video invitation sent by: Send to e-mail at: [email or web address]      Video Start Time: 1:50PM    Video-Visit Details    Type of service:  Video Visit    Video End Time (time video stopped):2:05 pm (video visit for 15 min)    Originating Location (pt. Location): Home    Distant Location (provider location):  [facility]     Mode of Communication:  Video Conference via [employer]      HPI: Ms. Kassy Casper is a 74 year old  female with PMH significant for history of CAD with PCI to LAD (3.5 mm stent) 1998, HTN, hyperlipidemia, and uncomplicated asthma     The patient was last seen in cardiology in 2016.  The patient reports overall doing well until few weeks ago.  She has noticed mild chest tightness and shortness of breath with exertion which is new to her.  She reports no associated symptoms like nausea or vomiting.  The symptom is not severe enough for her to stop.  She is able to do " all her ADLs without limitation.  Patient has no other cardiac symptom and denies a history PND, orthopnea, pedal edema, palpitations, lightheadedness, or syncope.    She has no history of smoking. Non DM. Report FH however sister had stent at the age of 64.     For hypertension, she is on hydrochlorothiazide 50 mg and lisinopril 10 mg.  Few days ago blood pressure was 138/78 mmHg.  Patient takes aspirin and pravastatin.  LDL is mildly elevated at 86 mg/deciliter.  Exercise stress echocardiogram from 2015 showed a structurally normal heart with no valvular disease.  Stress test was normal.  Medications, personal, family, and social history reviewed with patient and revised.    PAST MEDICAL HISTORY:  Past Medical History:   Diagnosis Date     Benign neoplasm of choroid     LE     CAD (coronary artery disease)      Coronary atherosclerosis of unspecified type of vessel, native or graft     s/p stint 1998     Disorder of bone and cartilage, unspecified     osteopenia on DEXA 2005     Hyperlipidemia LDL goal < 70      Hypertension      Insomnia, unspecified      Other and unspecified hyperlipidemia      PVD (posterior vitreous detachment), right eye      Uncomplicated asthma      Unspecified essential hypertension        CURRENT MEDICATIONS:  Current Outpatient Medications   Medication Sig Dispense Refill     ALPRAZolam (XANAX) 0.5 MG tablet TAKE 1 TABLET BY MOUTH NIGHTLY AS NEEDED FOR ANXIETY 30 tablet 0     aspirin 81 MG tablet Take 1 tablet by mouth daily.       EPINEPHrine (EPIPEN) 0.3 MG/0.3ML injection Inject 0.3 mLs (0.3 mg) into the muscle once as needed for anaphylaxis 2 each 3     hydrochlorothiazide (HYDRODIURIL) 50 MG tablet Take 1 tablet (50 mg) by mouth daily 90 tablet 3     lisinopril (PRINIVIL/ZESTRIL) 10 MG tablet Take 1 tablet (10 mg) by mouth daily 90 tablet 3     Multiple Vitamin (MULTIVITAMIN  S) CAPS Take 1 tablet by mouth daily.       pimecrolimus (ELIDEL) 1 % cream Apply topically 2 times daily  To redness on the face 60 g 11     potassium chloride ER (K-TAB) 20 MEQ CR tablet Take 1 tablet (20 mEq) by mouth daily 90 tablet 3     pravastatin (PRAVACHOL) 80 MG tablet Take 1 tablet (80 mg) by mouth daily 90 tablet 3     traZODone (DESYREL) 100 MG tablet TAKE 1 TABLET (100 MG) BY MOUTH NIGHTLY AS NEEDED FOR SLEEP  0     valACYclovir (VALTREX) 500 MG tablet Take 1 tablet (500 mg) by mouth daily For cold sore prophylaxis 90 tablet 3       PAST SURGICAL HISTORY:  Past Surgical History:   Procedure Laterality Date     ANGIOPLASTY  1998     COLONOSCOPY  2006       ALLERGIES:     Allergies   Allergen Reactions     Latex Other (See Comments)     Local skin reaction     Minocycline Hcl Swelling     Penicillin [Penicillins] Other (See Comments)     Asthma/Allergies a child so was told not to take         FAMILY HISTORY:  Family History   Problem Relation Age of Onset     Asthma Mother      Allergies Mother         hx urticaria     Eye Disorder Mother         Glauoma     Cerebrovascular Disease Mother      Alzheimer Disease Father      Breast Cancer Sister      Eye Disorder Maternal Aunt         x 2     Skin Cancer Maternal Aunt      Heart Disease Other         Numerous extended paternal family     Skin Cancer Other      Melanoma Other      Asthma Sister      Asthma Brother      Breast Cancer Maternal Aunt      Breast Cancer Maternal Aunt      Breast Cancer Maternal Aunt      Breast Cancer Other         Maternal cousins x3     Allergies Sister      Allergies Brother      Allergies Daughter         hx recurrent urticaria     Heart Disease Other      Cerebrovascular Disease Sister      Cancer No family hx of         No family hx skin cancer         SOCIAL HISTORY:  Social History     Tobacco Use     Smoking status: Never Smoker     Smokeless tobacco: Never Used   Substance Use Topics     Alcohol use: Yes     Comment: occasional glass wine     Drug use: No       ROS:   Constitutional: No fever, chills, or sweats. Weight  stable.   ENT: No visual disturbance, ear ache, epistaxis, sore throat.   Cardiovascular: As per HPI.   Respiratory: No cough, hemoptysis.    GI: No nausea, vomiting, hematemesis, melena, or hematochezia.   : No hematuria.   Integument: Negative.   Psychiatric: Negative.   Hematologic:  No easy bruising, no easy bleeding.  Neuro: Negative.   Endocrinology: No significant heat or cold intolerance   Musculoskeletal: No myalgia.    Exam:  Physical Exam Elements attainable via telehealth:       Constitutional - alert and no distress    Eyes - no redness, no discharge    Respiratory - no cough, no labored breathing    Skin - no discoloration or lesions on the face or the arm.    Neurological -alert, normal speech,and affect, no tremor.     I have reviewed the labs and personally reviewed the imaging below and made my comment in the assessment and plan.    Labs:  CBC RESULTS:   Lab Results   Component Value Date    WBC 4.6 02/06/2020    RBC 5.12 02/06/2020    HGB 14.5 02/06/2020    HCT 45.7 02/06/2020    MCV 89 02/06/2020    MCH 28.3 02/06/2020    MCHC 31.7 02/06/2020    RDW 12.3 02/06/2020     02/06/2020       BMP RESULTS:  Lab Results   Component Value Date     02/06/2020    POTASSIUM 3.0 (L) 02/06/2020    CHLORIDE 102 02/06/2020    CO2 34 (H) 02/06/2020    ANIONGAP 3 02/06/2020    GLC 87 02/06/2020    BUN 10 02/06/2020    CR 0.72 02/06/2020    GFRESTIMATED 82 02/06/2020    GFRESTBLACK >90 02/06/2020    BENY 9.8 02/06/2020        Stress Echocardiogram 1/15/2015    No stress induced regional wall motion abnormalities.  Normal resting LV function with EF of approximately 60-65%; normal response   to exercise with increase to approximately 70-75%.  No ECG evidence of ischemia.  No subjective evidence of ischemia.  Limited for age functional capacity.  PatientHeight: 62 in  PatientWeight: 130 lbs  SystolicPressure: 122 mmHg  DiastolicPressure: 84 mmHg      Assessment and Plan:   Ms. Kassy Casper is a 74 year  old  female with PMH significant for history of CAD with PCI to LAD 1998 (3.5 mm), HTN, hyperlipidemia, and uncomplicated asthma.    1.  Mild dyspnea on exertion and exertional chest tightness: This is new per patient over the last few weeks.  The patient reports similar symptoms prior to LAD stent in 1998.  Prior cardiology notes from 2008 reports mild disease of circumflex and RCA.  I was not able to see the cath report.  I recommended CT coronary angiogram since symptoms are new, exertional and similar to her prior coronary intervention in 1998.  Patient is agreeable to proceed.    2.  Hypertension: Appears to be slightly elevated at home despite lisinopril 10 and hydrochlorothiazide 50.  Recommended more regular home monitoring.    3.  Hyperlipidemia: Patient on pravastatin 80 mg daily.  LDL still mildly elevated at 86 from February of this year.  I will discuss switching to a different statin during my next visit.    I did not make any medication changes today.     A total of  15 minutes spent face-to-face through video encounter today with greater than 50% of the time spent in counseling and coordinating cares of the issues above.     Please donot hesitate to contact me if you have any questions or concerns. Again, thank you for allowing me to participate in the care of your patient.    Anastacio RENTERIA MD  [facility] Division of Cardiology  Pager [phone]

## 2020-07-01 ENCOUNTER — HOSPITAL ENCOUNTER (OUTPATIENT)
Dept: CT IMAGING | Facility: CLINIC | Age: 75
Discharge: HOME | End: 2020-07-01
Attending: INTERNAL MEDICINE | Admitting: INTERNAL MEDICINE
Payer: MEDICARE

## 2020-07-01 ENCOUNTER — TELEPHONE (OUTPATIENT)
Dept: CARDIOLOGY | Facility: CLINIC | Age: 75
End: 2020-07-01

## 2020-07-01 VITALS — HEART RATE: 59 BPM | DIASTOLIC BLOOD PRESSURE: 93 MMHG | SYSTOLIC BLOOD PRESSURE: 128 MMHG

## 2020-07-01 DIAGNOSIS — I20.89 STABLE ANGINA PECTORIS (H): ICD-10-CM

## 2020-07-01 LAB
CREAT BLD-MCNC: 0.6 MG/DL (ref 0.52–1.04)
GFR SERPL CREATININE-BSD FRML MDRD: >90 ML/MIN/{1.73_M2}

## 2020-07-01 PROCEDURE — 25000128 H RX IP 250 OP 636: Performed by: INTERNAL MEDICINE

## 2020-07-01 PROCEDURE — 25000132 ZZH RX MED GY IP 250 OP 250 PS 637: Mod: GY | Performed by: INTERNAL MEDICINE

## 2020-07-01 PROCEDURE — 75574 CT ANGIO HRT W/3D IMAGE: CPT

## 2020-07-01 PROCEDURE — 82565 ASSAY OF CREATININE: CPT

## 2020-07-01 PROCEDURE — 75574 CT ANGIO HRT W/3D IMAGE: CPT | Mod: 26 | Performed by: INTERNAL MEDICINE

## 2020-07-01 RX ORDER — NITROGLYCERIN 0.4 MG/1
.4-.8 TABLET SUBLINGUAL
Status: DISCONTINUED | OUTPATIENT
Start: 2020-07-01 | End: 2020-07-02 | Stop reason: HOSPADM

## 2020-07-01 RX ORDER — METHYLPREDNISOLONE SODIUM SUCCINATE 125 MG/2ML
125 INJECTION, POWDER, LYOPHILIZED, FOR SOLUTION INTRAMUSCULAR; INTRAVENOUS
Status: DISCONTINUED | OUTPATIENT
Start: 2020-07-01 | End: 2020-07-02 | Stop reason: HOSPADM

## 2020-07-01 RX ORDER — METOPROLOL TARTRATE 1 MG/ML
5-15 INJECTION, SOLUTION INTRAVENOUS
Status: DISCONTINUED | OUTPATIENT
Start: 2020-07-01 | End: 2020-07-02 | Stop reason: HOSPADM

## 2020-07-01 RX ORDER — ONDANSETRON 2 MG/ML
4 INJECTION INTRAMUSCULAR; INTRAVENOUS
Status: DISCONTINUED | OUTPATIENT
Start: 2020-07-01 | End: 2020-07-02 | Stop reason: HOSPADM

## 2020-07-01 RX ORDER — METOPROLOL TARTRATE 50 MG
50-100 TABLET ORAL
Status: COMPLETED | OUTPATIENT
Start: 2020-07-01 | End: 2020-07-01

## 2020-07-01 RX ORDER — IOPAMIDOL 755 MG/ML
120 INJECTION, SOLUTION INTRAVASCULAR ONCE
Status: COMPLETED | OUTPATIENT
Start: 2020-07-01 | End: 2020-07-01

## 2020-07-01 RX ORDER — DIPHENHYDRAMINE HYDROCHLORIDE 50 MG/ML
25-50 INJECTION INTRAMUSCULAR; INTRAVENOUS
Status: DISCONTINUED | OUTPATIENT
Start: 2020-07-01 | End: 2020-07-02 | Stop reason: HOSPADM

## 2020-07-01 RX ORDER — ACYCLOVIR 200 MG/1
0-1 CAPSULE ORAL
Status: DISCONTINUED | OUTPATIENT
Start: 2020-07-01 | End: 2020-07-02 | Stop reason: HOSPADM

## 2020-07-01 RX ORDER — DIPHENHYDRAMINE HCL 25 MG
25 CAPSULE ORAL
Status: DISCONTINUED | OUTPATIENT
Start: 2020-07-01 | End: 2020-07-02 | Stop reason: HOSPADM

## 2020-07-01 RX ADMIN — NITROGLYCERIN 0.8 MG: 0.4 TABLET SUBLINGUAL at 12:52

## 2020-07-01 RX ADMIN — IOPAMIDOL 120 ML: 755 INJECTION, SOLUTION INTRAVENOUS at 12:48

## 2020-07-01 RX ADMIN — METOPROLOL TARTRATE 50 MG: 50 TABLET, FILM COATED ORAL at 11:54

## 2020-07-01 NOTE — PROGRESS NOTES
Pt arrived for Coronary CT angiogram. Test, meds and side effects reviewed with pt.  Resting HR  64 bpm. Given 50 mg PO Metoprolol per verbal order. Administered 0.8 mg SL nitroglycerin on CTA table per order. CTA completed.  Patient tolerated procedure well and denies symptoms of allergic reaction.  Post monitoring completed and VSS.  D/C instructions reviewed with pt whom verbalized understanding of need to increase PO fluids today. D/C to gold waiting room accompanied by staff.

## 2020-07-01 NOTE — TELEPHONE ENCOUNTER
[facility] Call Center    Phone Message    May a detailed message be left on voicemail: yes     Reason for Call: Other: Pt calling in returning [employer] message she would like to inform it is fine to just write a new one.      Action Taken: Message routed to:  [facility] cardio     Travel Screening: Not Applicable

## 2020-07-02 DIAGNOSIS — F41.9 ANXIETY: ICD-10-CM

## 2020-07-03 RX ORDER — ALPRAZOLAM 0.5 MG
TABLET ORAL
Qty: 30 TABLET | Refills: 0 | Status: SHIPPED | OUTPATIENT
Start: 2020-07-03 | End: 2020-11-16

## 2020-07-03 NOTE — TELEPHONE ENCOUNTER
Patient Requested  ALPRAZolam (XANAX) 0.5 MG tablet  Last Filled  04/06/2020  Last Office Visit  02/06/2020  Next Office Visit  Nothing scheduled   Checked  07/03/2020    DX: Anxiety    Pharmacy: [facility] [id number] [id number]    .

## 2020-07-09 ENCOUNTER — TELEPHONE (OUTPATIENT)
Dept: CARDIOLOGY | Facility: CLINIC | Age: 75
End: 2020-07-09

## 2020-07-09 NOTE — TELEPHONE ENCOUNTER
[facility] Call Center    Phone Message    May a detailed message be left on voicemail: yes     Reason for Call: Other: .  yes please change the medication - pt did not want to list the question she is answering and said you would understand.    Action Taken: Message routed to:  [facility] heart    Travel Screening: Not Applicable

## 2020-07-13 DIAGNOSIS — I25.10 CORONARY ARTERY DISEASE INVOLVING NATIVE CORONARY ARTERY OF NATIVE HEART WITHOUT ANGINA PECTORIS: Primary | ICD-10-CM

## 2020-07-13 RX ORDER — ROSUVASTATIN CALCIUM 10 MG/1
10 TABLET, COATED ORAL DAILY
Qty: 90 TABLET | Refills: 3 | Status: SHIPPED | OUTPATIENT
Start: 2020-07-13 | End: 2021-07-06

## 2020-07-13 NOTE — PROGRESS NOTES
I discontinued pravastatin and started patient on rosuvastatin 10 mg.  Recommend lipid check in 3 months.  The lab tests ordered.

## 2020-09-03 DIAGNOSIS — G47.00 INSOMNIA: Primary | ICD-10-CM

## 2020-09-08 ENCOUNTER — TELEPHONE (OUTPATIENT)
Dept: FAMILY MEDICINE | Facility: CLINIC | Age: 75
End: 2020-09-08

## 2020-09-08 NOTE — TELEPHONE ENCOUNTER
[facility] Call Center    Phone Message    May a detailed message be left on voicemail: yes     Reason for Call: Medication Refill Request    Has the patient contacted the pharmacy for the refill? Yes   Name of medication being requested: TRAZODONE 100 MG TABLET   Provider who prescribed the medication: Dr Oconnell  Pharmacy: [facility] [id number] [address]   Date medication is needed: ASAP          Please call Kassy back to discuss. She did confirm with me that she wants the Trazodone to be refilled.        Action Taken: Message routed to:  [facility] P[facility]    Travel Screening: Not Applicable

## 2020-09-08 NOTE — TELEPHONE ENCOUNTER
Trazodone was discontinued on 2/2/19 due to upset stomach and it was replaced by Doxepin 10 mg. It looks like pt is still taking trazodone. I left a message to the pt to call me back for the clarification.

## 2020-09-08 NOTE — TELEPHONE ENCOUNTER
TRAZODONE 100 MG TABLET   Last Written Prescription Date:  12/17/2018  Last Fill Quantity: ?,   # refills: ?  Last Office Visit : 2/6/2020  Future Office visit:  None    Routing refill request to provider for review/approval because:  Reported as Historical?   Last filled 12/17/2018      Jojo Crow RN  Central Triage Red Flags/Med Refills

## 2020-09-09 RX ORDER — TRAZODONE HYDROCHLORIDE 100 MG/1
100 TABLET ORAL
Qty: 90 TABLET | Refills: 1 | Status: SHIPPED | OUTPATIENT
Start: 2020-09-09 | End: 2021-04-09

## 2020-11-09 DIAGNOSIS — I20.89 STABLE ANGINA PECTORIS (H): ICD-10-CM

## 2020-11-09 DIAGNOSIS — I25.10 CORONARY ARTERY DISEASE INVOLVING NATIVE CORONARY ARTERY OF NATIVE HEART WITHOUT ANGINA PECTORIS: ICD-10-CM

## 2020-11-09 DIAGNOSIS — E87.6 HYPOKALEMIA: ICD-10-CM

## 2020-11-09 LAB
ANION GAP SERPL CALCULATED.3IONS-SCNC: 2 MMOL/L (ref 3–14)
BUN SERPL-MCNC: 9 MG/DL (ref 7–30)
CALCIUM SERPL-MCNC: 9.5 MG/DL (ref 8.5–10.1)
CHLORIDE SERPL-SCNC: 102 MMOL/L (ref 94–109)
CHOLEST SERPL-MCNC: 152 MG/DL
CO2 SERPL-SCNC: 34 MMOL/L (ref 20–32)
CREAT SERPL-MCNC: 0.7 MG/DL (ref 0.52–1.04)
GFR SERPL CREATININE-BSD FRML MDRD: 85 ML/MIN/{1.73_M2}
GLUCOSE SERPL-MCNC: 97 MG/DL (ref 70–99)
HDLC SERPL-MCNC: 71 MG/DL
LDLC SERPL CALC-MCNC: 56 MG/DL
NONHDLC SERPL-MCNC: 80 MG/DL
POTASSIUM SERPL-SCNC: 3.5 MMOL/L (ref 3.4–5.3)
SODIUM SERPL-SCNC: 138 MMOL/L (ref 133–144)
TRIGL SERPL-MCNC: 121 MG/DL

## 2020-11-09 PROCEDURE — 36415 COLL VENOUS BLD VENIPUNCTURE: CPT | Performed by: PATHOLOGY

## 2020-11-09 PROCEDURE — 80048 BASIC METABOLIC PNL TOTAL CA: CPT | Performed by: PATHOLOGY

## 2020-11-09 PROCEDURE — 80061 LIPID PANEL: CPT | Performed by: PATHOLOGY

## 2020-11-13 DIAGNOSIS — F41.9 ANXIETY: ICD-10-CM

## 2020-11-16 RX ORDER — ALPRAZOLAM 0.5 MG
TABLET ORAL
Qty: 30 TABLET | Refills: 0 | Status: SHIPPED | OUTPATIENT
Start: 2020-11-16 | End: 2021-06-16

## 2020-11-16 NOTE — TELEPHONE ENCOUNTER
Patient Requested  ALPRAZolam (XANAX) 0.5 MG tablet  Last Filled  07/03/2020  Last Office Visit  02/06/2020  Next Office Visit  Nothing scheduled   Checked  11/16/2020    DX: Anxiety    Pharmacy: [facility] [id number] [id number]    .    DADA CLAYTON CMA at 3:05 PM on 11/16/2020.

## 2020-11-20 DIAGNOSIS — E87.6 HYPOKALEMIA: ICD-10-CM

## 2020-11-24 RX ORDER — POTASSIUM CHLORIDE 1500 MG/1
20 TABLET, EXTENDED RELEASE ORAL DAILY
Qty: 90 TABLET | Refills: 0 | Status: SHIPPED | OUTPATIENT
Start: 2020-11-24 | End: 2021-08-09

## 2020-12-06 ENCOUNTER — HEALTH MAINTENANCE LETTER (OUTPATIENT)
Age: 75
End: 2020-12-06

## 2021-02-13 DIAGNOSIS — Z00.00 ROUTINE GENERAL MEDICAL EXAMINATION AT A HEALTH CARE FACILITY: ICD-10-CM

## 2021-02-13 DIAGNOSIS — E78.5 HYPERLIPIDEMIA LDL GOAL <70: ICD-10-CM

## 2021-02-13 DIAGNOSIS — I10 HYPERTENSION, UNSPECIFIED TYPE: ICD-10-CM

## 2021-02-14 DIAGNOSIS — B00.1 RECURRENT COLD SORES: ICD-10-CM

## 2021-02-16 RX ORDER — LISINOPRIL 10 MG/1
10 TABLET ORAL DAILY
Qty: 90 TABLET | Refills: 0 | Status: SHIPPED | OUTPATIENT
Start: 2021-02-16 | End: 2021-12-31

## 2021-02-17 RX ORDER — VALACYCLOVIR HYDROCHLORIDE 500 MG/1
500 TABLET, FILM COATED ORAL DAILY
Qty: 90 TABLET | Refills: 0 | Status: SHIPPED | OUTPATIENT
Start: 2021-02-17 | End: 2021-08-20

## 2021-02-17 NOTE — TELEPHONE ENCOUNTER
valACYclovir (VALTREX) 500 MG tablet    Take 1 tablet (500 mg) by mouth daily     Last Written Prescription Date:  2/6/20  Last Fill Quantity: 90,   # refills: 3  Last Office Visit : 2/6/20  Follow-up: 1 year or PRN.    Future Office visit:  none    Routing refill request to provider for review/approval because:  Overdue visit. Scheduling has been notified to contact the pt for appointment.    90 day to pharmacy.

## 2021-02-17 NOTE — TELEPHONE ENCOUNTER
Last visit 2/4/20 Dr Oconnell [facility]:  May refill x 3 months if BP greater than or equal to 140/90,( 128/93) and refer to PCP for follow up.  Annual appt due.

## 2021-02-17 NOTE — TELEPHONE ENCOUNTER
Left message with Primary Care clinic number requesting patient to call back to schedule annual appointment with PCP, Last Office Visit : 2/6/20.

## 2021-04-07 DIAGNOSIS — G47.00 INSOMNIA: ICD-10-CM

## 2021-04-07 DIAGNOSIS — E78.5 HYPERLIPIDEMIA LDL GOAL <70: ICD-10-CM

## 2021-04-07 DIAGNOSIS — Z00.00 ROUTINE GENERAL MEDICAL EXAMINATION AT A HEALTH CARE FACILITY: ICD-10-CM

## 2021-04-07 DIAGNOSIS — I10 ESSENTIAL HYPERTENSION WITH GOAL BLOOD PRESSURE LESS THAN 140/90: ICD-10-CM

## 2021-04-09 RX ORDER — TRAZODONE HYDROCHLORIDE 100 MG/1
100 TABLET ORAL
Qty: 90 TABLET | Refills: 0 | Status: SHIPPED | OUTPATIENT
Start: 2021-04-09 | End: 2021-09-26

## 2021-04-09 RX ORDER — HYDROCHLOROTHIAZIDE 50 MG/1
50 TABLET ORAL DAILY
Qty: 90 TABLET | Refills: 0 | Status: SHIPPED | OUTPATIENT
Start: 2021-04-09 | End: 2021-07-09

## 2021-04-09 NOTE — TELEPHONE ENCOUNTER
HYDROCHLOROTHIAZIDE 50 MG TAB   Last Written Prescription Date:  2/26/20  Last Fill Quantity: 90,   # refills: 03  traZODone (DESYREL) 100 MG tablet  Last Written Prescription Date:  9/9/20  Last Fill Quantity: 90,   # refills: 1  Last Office Visit : 2/6/20  Future Office visit:  None       Appt due. 90 day dany sent x 1 now   Can refill x 3 months if BP greater than or equal to 140/90, and refer to PCP for follow up.

## 2021-04-11 ENCOUNTER — HEALTH MAINTENANCE LETTER (OUTPATIENT)
Age: 76
End: 2021-04-11

## 2021-04-19 ENCOUNTER — ANCILLARY PROCEDURE (OUTPATIENT)
Dept: MAMMOGRAPHY | Facility: CLINIC | Age: 76
End: 2021-04-19
Attending: FAMILY MEDICINE
Payer: MEDICARE

## 2021-04-19 ENCOUNTER — OFFICE VISIT (OUTPATIENT)
Dept: CARDIOLOGY | Facility: CLINIC | Age: 76
End: 2021-04-19
Attending: INTERNAL MEDICINE
Payer: MEDICARE

## 2021-04-19 VITALS
OXYGEN SATURATION: 100 % | WEIGHT: 133.5 LBS | HEART RATE: 67 BPM | BODY MASS INDEX: 24.56 KG/M2 | DIASTOLIC BLOOD PRESSURE: 82 MMHG | HEIGHT: 62 IN | SYSTOLIC BLOOD PRESSURE: 161 MMHG

## 2021-04-19 DIAGNOSIS — I25.10 CORONARY ARTERY DISEASE INVOLVING NATIVE CORONARY ARTERY OF NATIVE HEART WITHOUT ANGINA PECTORIS: Primary | ICD-10-CM

## 2021-04-19 DIAGNOSIS — I10 BENIGN ESSENTIAL HYPERTENSION: ICD-10-CM

## 2021-04-19 DIAGNOSIS — Z12.31 VISIT FOR SCREENING MAMMOGRAM: ICD-10-CM

## 2021-04-19 PROCEDURE — 99214 OFFICE O/P EST MOD 30 MIN: CPT | Performed by: INTERNAL MEDICINE

## 2021-04-19 PROCEDURE — 77067 SCR MAMMO BI INCL CAD: CPT | Mod: GC | Performed by: RADIOLOGY

## 2021-04-19 PROCEDURE — 77063 BREAST TOMOSYNTHESIS BI: CPT | Mod: GC | Performed by: RADIOLOGY

## 2021-04-19 PROCEDURE — G0463 HOSPITAL OUTPT CLINIC VISIT: HCPCS

## 2021-04-19 ASSESSMENT — MIFFLIN-ST. JEOR: SCORE: 1050.86

## 2021-04-19 ASSESSMENT — PAIN SCALES - GENERAL: PAINLEVEL: NO PAIN (0)

## 2021-04-19 NOTE — NURSING NOTE
Chief Complaint   Patient presents with     Follow Up     follow up per patient      Vitals were taken, medications reconciled and EKG performed.     CARMELO Ayala  4:34 PM

## 2021-04-19 NOTE — PROGRESS NOTES
HPI: Ms. Kassy Casper is a 74 year old  female with PMH significant for history of CAD with PCI to LAD (3.5 mm stent) 1998, HTN, hyperlipidemia, and uncomplicated asthma     The patient was last seen in cardiology in 2016.  The patient reports overall doing well until few weeks ago.  She has noticed mild chest tightness and shortness of breath with exertion which is new to her.  She reports no associated symptoms like nausea or vomiting.  The symptom is not severe enough for her to stop.  She is able to do all her ADLs without limitation.  Patient has no other cardiac symptom and denies a history PND, orthopnea, pedal edema, palpitations, lightheadedness, or syncope.    She has no history of smoking. Non DM. Report FH however sister had stent at the age of 64.     For hypertension, she is on hydrochlorothiazide 50 mg and lisinopril 10 mg.  Few days ago blood pressure was 138/78 mmHg.  Patient takes aspirin and pravastatin.  LDL is mildly elevated at 86 mg/deciliter.  Exercise stress echocardiogram from 2015 showed a structurally normal heart with no valvular disease.  Stress test was normal.  Medications, personal, family, and social history reviewed with patient and revised.    Interval history 4/19/2021:  Patient is being seen today for annual follow-up.  Reports doing well from cardiac standpoint.  No chest pain, shortness of breath, dizziness, palpitations or lower extremity edema.  She walks up to 5 miles and has no exertional symptoms.  She tells me that blood pressure at home is usually 120/60 mmHg.  Blood pressure is elevated in clinic today.    PAST MEDICAL HISTORY:  Past Medical History:   Diagnosis Date     Benign neoplasm of choroid     LE     CAD (coronary artery disease)      Coronary atherosclerosis of unspecified type of vessel, native or graft     s/p stint 1998     Disorder of bone and cartilage, unspecified     osteopenia on DEXA 2005     Hyperlipidemia LDL goal < 70      Hypertension       Insomnia, unspecified      Other and unspecified hyperlipidemia      PVD (posterior vitreous detachment), right eye      Uncomplicated asthma      Unspecified essential hypertension        CURRENT MEDICATIONS:  Current Outpatient Medications   Medication Sig Dispense Refill     ALPRAZolam (XANAX) 0.5 MG tablet TAKE 1 TABLET BY MOUTH NIGHTLY AS NEEDED FOR ANXIETY 30 tablet 0     aspirin 81 MG tablet Take 1 tablet by mouth daily.       EPINEPHrine (EPIPEN) 0.3 MG/0.3ML injection Inject 0.3 mLs (0.3 mg) into the muscle once as needed for anaphylaxis 2 each 3     hydrochlorothiazide (HYDRODIURIL) 50 MG tablet Take 1 tablet (50 mg) by mouth daily Please call [phone] for appt 90 tablet 0     lisinopril (ZESTRIL) 10 MG tablet Take 1 tablet (10 mg) by mouth daily Please call to schedule annual appt [phone] 90 tablet 0     Multiple Vitamin (MULTIVITAMIN  S) CAPS Take 1 tablet by mouth daily.       pimecrolimus (ELIDEL) 1 % cream Apply topically 2 times daily To redness on the face (Patient not taking: Reported on 6/22/2020) 60 g 11     potassium chloride ER (K-TAB) 20 MEQ CR tablet Take 1 tablet (20 mEq) by mouth daily 90 tablet 0     rosuvastatin (CRESTOR) 10 MG tablet Take 1 tablet (10 mg) by mouth daily 90 tablet 3     traZODone (DESYREL) 100 MG tablet Take 1 tablet (100 mg) by mouth nightly as needed for sleep Please call [phone] for annual appointment 90 tablet 0     valACYclovir (VALTREX) 500 MG tablet Take 1 tablet (500 mg) by mouth daily Please call Primary Care at [phone] to schedule appointment. Thank you. 90 tablet 0       PAST SURGICAL HISTORY:  Past Surgical History:   Procedure Laterality Date     ANGIOPLASTY  1998     COLONOSCOPY  2006       ALLERGIES:     Allergies   Allergen Reactions     Latex Other (See Comments)     Local skin reaction     Minocycline Hcl Swelling     Penicillin [Penicillins] Other (See Comments)     Asthma/Allergies a child so was told not to take         FAMILY  "HISTORY:  Family History   Problem Relation Age of Onset     Asthma Mother      Allergies Mother         hx urticaria     Eye Disorder Mother         Glauoma     Cerebrovascular Disease Mother      Alzheimer Disease Father      Breast Cancer Sister      Eye Disorder Maternal Aunt         x 2     Skin Cancer Maternal Aunt      Heart Disease Other         Numerous extended paternal family     Skin Cancer Other      Melanoma Other      Asthma Sister      Asthma Brother      Breast Cancer Maternal Aunt      Breast Cancer Maternal Aunt      Breast Cancer Maternal Aunt      Breast Cancer Other         Maternal cousins x3     Allergies Sister      Allergies Brother      Allergies Daughter         hx recurrent urticaria     Heart Disease Other      Cerebrovascular Disease Sister      Cancer No family hx of         No family hx skin cancer         SOCIAL HISTORY:  Social History     Tobacco Use     Smoking status: Never Smoker     Smokeless tobacco: Never Used   Substance Use Topics     Alcohol use: Yes     Comment: occasional glass wine     Drug use: No       ROS:   Constitutional: No fever, chills, or sweats. Weight stable.   Cardiovascular: As per HPI.   Respiratory: No cough, hemoptysis.    GI: No nausea, vomiting, hematemesis, melena, or hematochezia.   : No hematuria.   Integument: Negative.   Psychiatric: Negative.   Hematologic:  No easy bruising, no easy bleeding.  Neuro: Negative.   Musculoskeletal: No myalgia.    Exam:  BP (!) 161/82 (BP Location: Left arm, Patient Position: Chair, Cuff Size: Adult Regular)   Pulse 67   Ht 1.562 m (5' 1.5\")   Wt 60.6 kg (133 lb 8 oz)   SpO2 100%   BMI 24.82 kg/m    GENERAL APPEARANCE: alert and no distress  HEENT: no icterus, no central cyanosis  LYMPH/NECK: no adenopathy, no asymmetry, JVP not elevated, no carotid bruits.  RESPIRATORY: lungs clear to auscultation - no rales, rhonchi or wheezes, no use of accessory muscles, no retractions, respirations are unlabored, normal " respiratory rate  CARDIOVASCULAR: regular rhythm, normal S1, S2, no S3 or S4 and no murmur, click or rub, precordium quiet with normal PMI.  GI: soft, non tender  EXTREMITIES: peripheral pulses normal, no edema  NEURO: alert, normal speech,and affect  VASC: Radial, dorsalis pedis and posterior tibialis pulses are normal in volumes and symmetric bilaterally.   SKIN: no ecchymoses, no rashes     I have reviewed the labs and personally reviewed the imaging below and made my comment in the assessment and plan.    Labs:  CBC RESULTS:   Lab Results   Component Value Date    WBC 4.6 02/06/2020    RBC 5.12 02/06/2020    HGB 14.5 02/06/2020    HCT 45.7 02/06/2020    MCV 89 02/06/2020    MCH 28.3 02/06/2020    MCHC 31.7 02/06/2020    RDW 12.3 02/06/2020     02/06/2020       BMP RESULTS:  Lab Results   Component Value Date     11/09/2020    POTASSIUM 3.5 11/09/2020    CHLORIDE 102 11/09/2020    CO2 34 (H) 11/09/2020    ANIONGAP 2 (L) 11/09/2020    GLC 97 11/09/2020    BUN 9 11/09/2020    CR 0.70 11/09/2020    GFRESTIMATED 85 11/09/2020    GFRESTBLACK >90 11/09/2020    BENY 9.5 11/09/2020      CT coronary angiogram 7/1/2020 [facility]  IMPRESSION:  1.  Patent large proximal LAD stent with mild in-stent restenosis. LAD  is diffusely small, with mild stenosis immediately proximal and distal  to the stent. No significant CAD elsewhere.    Stress Echocardiogram 1/15/2015    No stress induced regional wall motion abnormalities.  Normal resting LV function with EF of approximately 60-65%; normal response   to exercise with increase to approximately 70-75%.  No ECG evidence of ischemia.  No subjective evidence of ischemia.  Limited for age functional capacity.  PatientHeight: 62 in  PatientWeight: 130 lbs  SystolicPressure: 122 mmHg  DiastolicPressure: 84 mmHg    Assessment and Plan:   Ms. Kassy Casper is a 74 year old  female with PMH significant for history of CAD with PCI to LAD 1998 (3.5 mm), HTN, hyperlipidemia, and  uncomplicated asthma.    #Coronary artery disease  -Currently asymptomatic  -Most recent CT angiogram 7/2020 showed patent stent with mild nonobstructive coronary artery disease.  -Continue aspirin and Crestor.  LDL well controlled    #Hypertension  -Reports normal blood pressure at home.  -Elevated in clinic today.  Recommended home monitoring for a week and report to me through [employer].    I did not make any medication changes today.    Return to clinic 2 years.    Total time spent 30 minutes including precharting, face-to-face clinic visit and medical documentation.    Please donot hesitate to contact me if you have any questions or concerns. Again, thank you for allowing me to participate in the care of your patient.    Anastacio RENTERIA MD  [facility] Division of Cardiology  Pager [phone]

## 2021-04-19 NOTE — LETTER
4/19/2021      RE: Kassy Casper  [address] [place] [address]       Dear Colleague,    Thank you for the opportunity to participate in the care of your patient, Kassy Casper, at the [facility] [place] [facility]. Please see a copy of my visit note below.    HPI: Ms. Kassy Casper is a 74 year old  female with PMH significant for history of CAD with PCI to LAD (3.5 mm stent) 1998, HTN, hyperlipidemia, and uncomplicated asthma     The patient was last seen in cardiology in 2016.  The patient reports overall doing well until few weeks ago.  She has noticed mild chest tightness and shortness of breath with exertion which is new to her.  She reports no associated symptoms like nausea or vomiting.  The symptom is not severe enough for her to stop.  She is able to do all her ADLs without limitation.  Patient has no other cardiac symptom and denies a history PND, orthopnea, pedal edema, palpitations, lightheadedness, or syncope.    She has no history of smoking. Non DM. Report FH however sister had stent at the age of 64.     For hypertension, she is on hydrochlorothiazide 50 mg and lisinopril 10 mg.  Few days ago blood pressure was 138/78 mmHg.  Patient takes aspirin and pravastatin.  LDL is mildly elevated at 86 mg/deciliter.  Exercise stress echocardiogram from 2015 showed a structurally normal heart with no valvular disease.  Stress test was normal.  Medications, personal, family, and social history reviewed with patient and revised.    Interval history 4/19/2021:  Patient is being seen today for annual follow-up.  Reports doing well from cardiac standpoint.  No chest pain, shortness of breath, dizziness, palpitations or lower extremity edema.  She walks up to 5 miles and has no exertional symptoms.  She tells me that blood pressure at home is usually 120/60 mmHg.  Blood pressure is elevated in clinic today.    PAST MEDICAL  HISTORY:  Past Medical History:   Diagnosis Date     Benign neoplasm of choroid     LE     CAD (coronary artery disease)      Coronary atherosclerosis of unspecified type of vessel, native or graft     s/p stint 1998     Disorder of bone and cartilage, unspecified     osteopenia on DEXA 2005     Hyperlipidemia LDL goal < 70      Hypertension      Insomnia, unspecified      Other and unspecified hyperlipidemia      PVD (posterior vitreous detachment), right eye      Uncomplicated asthma      Unspecified essential hypertension        CURRENT MEDICATIONS:  Current Outpatient Medications   Medication Sig Dispense Refill     ALPRAZolam (XANAX) 0.5 MG tablet TAKE 1 TABLET BY MOUTH NIGHTLY AS NEEDED FOR ANXIETY 30 tablet 0     aspirin 81 MG tablet Take 1 tablet by mouth daily.       EPINEPHrine (EPIPEN) 0.3 MG/0.3ML injection Inject 0.3 mLs (0.3 mg) into the muscle once as needed for anaphylaxis 2 each 3     hydrochlorothiazide (HYDRODIURIL) 50 MG tablet Take 1 tablet (50 mg) by mouth daily Please call [phone] for appt 90 tablet 0     lisinopril (ZESTRIL) 10 MG tablet Take 1 tablet (10 mg) by mouth daily Please call to schedule annual appt [phone] 90 tablet 0     Multiple Vitamin (MULTIVITAMIN  S) CAPS Take 1 tablet by mouth daily.       pimecrolimus (ELIDEL) 1 % cream Apply topically 2 times daily To redness on the face (Patient not taking: Reported on 6/22/2020) 60 g 11     potassium chloride ER (K-TAB) 20 MEQ CR tablet Take 1 tablet (20 mEq) by mouth daily 90 tablet 0     rosuvastatin (CRESTOR) 10 MG tablet Take 1 tablet (10 mg) by mouth daily 90 tablet 3     traZODone (DESYREL) 100 MG tablet Take 1 tablet (100 mg) by mouth nightly as needed for sleep Please call [phone] for annual appointment 90 tablet 0     valACYclovir (VALTREX) 500 MG tablet Take 1 tablet (500 mg) by mouth daily Please call Primary Care at [phone] to schedule appointment. Thank you. 90 tablet 0       PAST SURGICAL HISTORY:  Past  "Surgical History:   Procedure Laterality Date     ANGIOPLASTY  1998     COLONOSCOPY  2006       ALLERGIES:     Allergies   Allergen Reactions     Latex Other (See Comments)     Local skin reaction     Minocycline Hcl Swelling     Penicillin [Penicillins] Other (See Comments)     Asthma/Allergies a child so was told not to take         FAMILY HISTORY:  Family History   Problem Relation Age of Onset     Asthma Mother      Allergies Mother         hx urticaria     Eye Disorder Mother         Glauoma     Cerebrovascular Disease Mother      Alzheimer Disease Father      Breast Cancer Sister      Eye Disorder Maternal Aunt         x 2     Skin Cancer Maternal Aunt      Heart Disease Other         Numerous extended paternal family     Skin Cancer Other      Melanoma Other      Asthma Sister      Asthma Brother      Breast Cancer Maternal Aunt      Breast Cancer Maternal Aunt      Breast Cancer Maternal Aunt      Breast Cancer Other         Maternal cousins x3     Allergies Sister      Allergies Brother      Allergies Daughter         hx recurrent urticaria     Heart Disease Other      Cerebrovascular Disease Sister      Cancer No family hx of         No family hx skin cancer         SOCIAL HISTORY:  Social History     Tobacco Use     Smoking status: Never Smoker     Smokeless tobacco: Never Used   Substance Use Topics     Alcohol use: Yes     Comment: occasional glass wine     Drug use: No       ROS:   Constitutional: No fever, chills, or sweats. Weight stable.   Cardiovascular: As per HPI.   Respiratory: No cough, hemoptysis.    GI: No nausea, vomiting, hematemesis, melena, or hematochezia.   : No hematuria.   Integument: Negative.   Psychiatric: Negative.   Hematologic:  No easy bruising, no easy bleeding.  Neuro: Negative.   Musculoskeletal: No myalgia.    Exam:  BP (!) 161/82 (BP Location: Left arm, Patient Position: Chair, Cuff Size: Adult Regular)   Pulse 67   Ht 1.562 m (5' 1.5\")   Wt 60.6 kg (133 lb 8 oz)   " SpO2 100%   BMI 24.82 kg/m    GENERAL APPEARANCE: alert and no distress  HEENT: no icterus, no central cyanosis  LYMPH/NECK: no adenopathy, no asymmetry, JVP not elevated, no carotid bruits.  RESPIRATORY: lungs clear to auscultation - no rales, rhonchi or wheezes, no use of accessory muscles, no retractions, respirations are unlabored, normal respiratory rate  CARDIOVASCULAR: regular rhythm, normal S1, S2, no S3 or S4 and no murmur, click or rub, precordium quiet with normal PMI.  GI: soft, non tender  EXTREMITIES: peripheral pulses normal, no edema  NEURO: alert, normal speech,and affect  VASC: Radial, dorsalis pedis and posterior tibialis pulses are normal in volumes and symmetric bilaterally.   SKIN: no ecchymoses, no rashes     I have reviewed the labs and personally reviewed the imaging below and made my comment in the assessment and plan.    Labs:  CBC RESULTS:   Lab Results   Component Value Date    WBC 4.6 02/06/2020    RBC 5.12 02/06/2020    HGB 14.5 02/06/2020    HCT 45.7 02/06/2020    MCV 89 02/06/2020    MCH 28.3 02/06/2020    MCHC 31.7 02/06/2020    RDW 12.3 02/06/2020     02/06/2020       BMP RESULTS:  Lab Results   Component Value Date     11/09/2020    POTASSIUM 3.5 11/09/2020    CHLORIDE 102 11/09/2020    CO2 34 (H) 11/09/2020    ANIONGAP 2 (L) 11/09/2020    GLC 97 11/09/2020    BUN 9 11/09/2020    CR 0.70 11/09/2020    GFRESTIMATED 85 11/09/2020    GFRESTBLACK >90 11/09/2020    BENY 9.5 11/09/2020      CT coronary angiogram 7/1/2020 [facility]  IMPRESSION:  1.  Patent large proximal LAD stent with mild in-stent restenosis. LAD  is diffusely small, with mild stenosis immediately proximal and distal  to the stent. No significant CAD elsewhere.    Stress Echocardiogram 1/15/2015    No stress induced regional wall motion abnormalities.  Normal resting LV function with EF of approximately 60-65%; normal response   to exercise with increase to approximately 70-75%.  No ECG evidence of  ischemia.  No subjective evidence of ischemia.  Limited for age functional capacity.  PatientHeight: 62 in  PatientWeight: 130 lbs  SystolicPressure: 122 mmHg  DiastolicPressure: 84 mmHg    Assessment and Plan:   Ms. Kassy Casper is a 74 year old  female with PMH significant for history of CAD with PCI to LAD 1998 (3.5 mm), HTN, hyperlipidemia, and uncomplicated asthma.    #Coronary artery disease  -Currently asymptomatic  -Most recent CT angiogram 7/2020 showed patent stent with mild nonobstructive coronary artery disease.  -Continue aspirin and Crestor.  LDL well controlled    #Hypertension  -Reports normal blood pressure at home.  -Elevated in clinic today.  Recommended home monitoring for a week and report to me through [email or web address].    I did not make any medication changes today.    Return to clinic 2 years.    Total time spent 30 minutes including precharting, face-to-face clinic visit and medical documentation.    Please donot hesitate to contact me if you have any questions or concerns. Again, thank you for allowing me to participate in the care of your patient.    Anastacio RENTERIA MD  [facility] Division of Cardiology  Pager [phone]

## 2021-04-19 NOTE — PATIENT INSTRUCTIONS
Please monitor your blood pressure and pulse for one week.  Measure once in morning and once in evening.   Always measure after at least 10 minutes of calm rest.   Keep a log and send it to us when complete via [employer] message or call [phone] to leave a message to be sent to clinic for review.   Follow up with Dr. Israel in 2 years. You will receive a scheduling reminder in advance.

## 2021-05-11 DIAGNOSIS — E78.5 HYPERLIPIDEMIA LDL GOAL <70: ICD-10-CM

## 2021-05-11 DIAGNOSIS — G47.00 INSOMNIA: ICD-10-CM

## 2021-05-11 DIAGNOSIS — I10 ESSENTIAL HYPERTENSION WITH GOAL BLOOD PRESSURE LESS THAN 140/90: ICD-10-CM

## 2021-05-11 DIAGNOSIS — Z00.00 ROUTINE GENERAL MEDICAL EXAMINATION AT A HEALTH CARE FACILITY: ICD-10-CM

## 2021-05-13 RX ORDER — TRAZODONE HYDROCHLORIDE 100 MG/1
TABLET ORAL
Qty: 90 TABLET | Refills: 0 | OUTPATIENT
Start: 2021-05-13

## 2021-05-13 RX ORDER — HYDROCHLOROTHIAZIDE 50 MG/1
50 TABLET ORAL DAILY
Qty: 90 TABLET | Refills: 0 | OUTPATIENT
Start: 2021-05-13

## 2021-05-13 NOTE — TELEPHONE ENCOUNTER
HYDROCHLOROTHIAZIDE 50 MG TAB  hydrochlorothiazide (HYDRODIURIL) 50 MG tablet 90 tablet 0 4/9/2021  No   Sig - Route: Take 1 tablet (50 mg) by mouth daily Please call [phone] for appt - Oral   Sent to pharmacy as: hydroCHLOROthiazide 50 MG Oral Tablet (HYDRODIURIL)   Class: E-Prescribe   Order: [id number]   E-Prescribing Status: Receipt confirmed by pharmacy (4/9/2021  1:44 PM CDT)   Printout Tracking    External Result Report   Medication Administration Instructions    Please call [phone] for appt   Pharmacy    [facility] [id number] [place] [address]         TRAZODONE 100 MG TABLET  traZODone (DESYREL) 100 MG tablet 90 tablet 0 4/9/2021  No   Sig - Route: Take 1 tablet (100 mg) by mouth nightly as needed for sleep Please call [phone] for annual appointment - Oral   Sent to pharmacy as: traZODone HCl 100 MG Oral Tablet (DESYREL)   Class: E-Prescribe   Order: [id number]   E-Prescribing Status: Receipt confirmed by pharmacy (4/9/2021  1:44 PM CDT)   Printout Tracking    External Result Report   Medication Administration Instructions    Please call [phone] for annual appointment   Pharmacy    [facility] [address] [place] [address]

## 2021-05-25 ENCOUNTER — OFFICE VISIT (OUTPATIENT)
Dept: FAMILY MEDICINE | Facility: CLINIC | Age: 76
End: 2021-05-25
Payer: MEDICARE

## 2021-05-25 VITALS — OXYGEN SATURATION: 98 % | SYSTOLIC BLOOD PRESSURE: 145 MMHG | HEART RATE: 65 BPM | DIASTOLIC BLOOD PRESSURE: 83 MMHG

## 2021-05-25 DIAGNOSIS — Z00.00 WELLNESS EXAMINATION: Primary | ICD-10-CM

## 2021-05-25 PROCEDURE — G0439 PPPS, SUBSEQ VISIT: HCPCS | Performed by: FAMILY MEDICINE

## 2021-05-25 ASSESSMENT — PAIN SCALES - GENERAL: PAINLEVEL: NO PAIN (0)

## 2021-05-25 NOTE — PROGRESS NOTES
Medicare Annual Wellness Visit         HPI     This 75 year old female presents as an established patient  Krunal Oconnell who presents for an subsequent Medicare Wellness Exam.  Patient also reports rash nape of neck. Itchy. One year. No topical exp. Tried nothing for.   Had a rash there as a child told eczema.      Patient Active Problem List   Diagnosis     CARDIOVASCULAR SCREENING; LDL GOAL LESS THAN 100     Insomnia     CAD (coronary artery disease)     Hypertension     Hyperlipidemia with target LDL less than 70     Rosacea     Post-inflammatory hyperpigmentation     Lumbago     Acne     H/O cold sores       Past Medical History:   Diagnosis Date     Benign neoplasm of choroid     LE     CAD (coronary artery disease)      Coronary atherosclerosis of unspecified type of vessel, native or graft     s/p stint 1998     Disorder of bone and cartilage, unspecified     osteopenia on DEXA 2005     Hyperlipidemia LDL goal < 70      Hypertension      Insomnia, unspecified      Other and unspecified hyperlipidemia      PVD (posterior vitreous detachment), right eye      Uncomplicated asthma      Unspecified essential hypertension         Family History   Problem Relation Age of Onset     Asthma Mother      Allergies Mother         hx urticaria     Eye Disorder Mother         Glauoma     Cerebrovascular Disease Mother      Alzheimer Disease Father      Breast Cancer Sister      Eye Disorder Maternal Aunt         x 2     Skin Cancer Maternal Aunt      Heart Disease Other         Numerous extended paternal family     Skin Cancer Other      Melanoma Other      Asthma Sister      Asthma Brother      Breast Cancer Maternal Aunt      Breast Cancer Maternal Aunt      Breast Cancer Maternal Aunt      Breast Cancer Other         Maternal cousins x3     Allergies Sister      Allergies Brother      Allergies Daughter         hx recurrent urticaria     Heart Disease Other      Cerebrovascular Disease Sister      Cancer No family  hx of         No family hx skin cancer         Past Surgical History:   Procedure Laterality Date     ANGIOPLASTY  1998     COLONOSCOPY  2006       Reviewed no other significant FH    Family History and past Medical History reviewed and it is unchanged/updated.       Review of Systems           Ten point ROS completed, otherwise negative       Medical Care     Have you been to an ER or a hospital in the last year? No  What other specialists or organizations are involved in your medical care?  below  Current providers sharing in care for this patient include:  Patient Care Team:  Krunal Oconnell MD as PCP - General (Family Practice)  Ismael Roach MD as MD (Colon and Rectal Surgery)  Krunal Oconnell MD as MD (Family Practice)  Lynette Castaneda MD as MD (Ophthalmology)  Eunice Kelly MD as MD (Ophthalmology)  Anastacio Israel MD as Assigned Heart and Vascular Provider         Social History     Social History     Tobacco Use     Smoking status: Never Smoker     Smokeless tobacco: Never Used   Substance Use Topics     Alcohol use: Yes     Comment: occasional glass wine     Marital Status:  Who lives in your household? N/A   Does your home have any of the following safety concerns? Loose rugs in the hallway, no grab bars in the bathroom, no handrails on the stairs or have poorly lit areas?  No  Do you feel threatened or controlled by a partner, ex-partner or anyone in your life? No  Has anyone hurt you physically, for example by pushing, hitting, slapping or kicking you   or forcing you to have sex? No  Do you need help with the phone, transportation, shopping, preparing meals, housework, laundry, medications or managing money? No   Have you noticed any hearing difficulties? No      Risk Behaviors and Healthy Habits     How many servings of fruits and vegetables do you eat a day? 3  How often do you exercise and what do you do? 60 minutes 5x a week  Do you frequently ride without a  seatbelt? No  Do you use tobacco?  No  Do you use any other drugs? No         Do you use alcohol?No    Today's PHQ-2 Score: 0    Sexual Health     Are you sexually active?  Yes   If yes, with men, women, or both? Male  If yes, do you more than one current partner?No  If yes, are you using condoms?  N/A   Have you had any sexually transmitted infections? No   Any sexual concerns? No     FOR WOMEN  What year did you stop having periods? 1995  Any vaginal bleeding in the last year? No  Have you ever had an abnormal Pap smear? No    FUNCTIONAL ABILITY/SAFETY SCREENING     Fall Risk Assessment Today: Fallen 2 or more times in the past year?: No  Any fall with injury in the past year?: NoNo falls    Hearing evaluation if done: No    EVALUATION OF COGNITIVE FUNCTION     Mood/affect:Normal  Appearance:Normal  Family member/caregiver input: Normal    Mini Cog Scoring   2 points   Clock Draw Test result:  Normal      SCREENING FOR PREVENTION and EARLY DETECTION     ECG (if done)not performed    Corrected Visual acuity: sees eye MD routinely    CV Risk based on Pooled Cohort Risk:  The 10-year ASCVD risk score ([place] KIRKLAND Jr., et al., 2013) is: 22.9%    Values used to calculate the score:      Age: 75 years      Sex: Female      Is Non- : No      Diabetic: No      Tobacco smoker: No      Systolic Blood Pressure: 145 mmHg      Is BP treated: Yes      HDL Cholesterol: 71 mg/dL      Total Cholesterol: 152 mg/dL    See heart notes-on asa, crestor  Advanced Directives: Discussed and patient desires to she will get a a copy.      Immunization History   Administered Date(s) Administered     COVID-19,PF,Moderna 02/26/2021, 03/26/2021     Influenza (High Dose) 3 valent vaccine 09/24/2015, 09/26/2016, 01/19/2018, 11/26/2019     Influenza (IIV3) PF 09/16/2009, 10/04/2010, 11/15/2012, 10/07/2013, 11/20/2014     Influenza, Quad, High Dose, Pf, 65yr + 11/19/2020     Pneumo Conj 13-V (2010&after) 02/26/2015      Pneumococcal 23 valent 11/15/2012     Poliovirus, inactivated (IPV) 01/30/2008     TDAP Vaccine (Boostrix) 01/19/2018     Tdap (Adacel,Boostrix) 01/30/2008     Typhoid IM 01/30/2008     Zoster vaccine, live 11/15/2012       Reviewed Immunization Record Today  Pneumoccocal Vaccine: No  Varicella Vaccine: No  TDaP:No         Physical Exam     Vitals: BP (!) 145/83 (BP Location: Right arm, Patient Position: Sitting, Cuff Size: Adult Regular)   Pulse 65   SpO2 98%   BMI= There is no height or weight on file to calculate BMI.  GENERAL APPEARANCE: healthy, alert and no distress  EYES: Eyes grossly normal to inspection, PERRL and conjunctivae and sclerae normal  HENT: ear canals and TM's normal, nose and mouth without ulcers or lesions, oropharynx clear and oral mucous membranes moist  NECK: no adenopathy, no asymmetry, masses, or scars and thyroid normal to palpation  RESP: lungs clear to auscultation - no rales, rhonchi or wheezes  CV: regular rate and rhythm, normal S1 S2, no S3 or S4, no murmur, click or rub, no peripheral edema and peripheral pulses strong  ABDOMEN: soft, nontender, no hepatosplenomegaly, no masses and bowel sounds normal  MS: no musculoskeletal defects are noted and gait is age appropriate without ataxia  SKIN: no suspicious lesions or rashes excetp mild red patch three inches nape of neck diffuse no open tender warm draining or swollen  NEURO: Normal strength and tone, sensory exam grossly normal, mentation intact and speech normal  PSYCH: mentation appears normal and affect normal/bright    A/p  Rash: otc cortaid follow-up prn if not better try lotrisone  Get us copy living will  Cont asa, statin, bp meds per cardio  Utd hcm, rvwd I suggested get shingrix at drugstore      Options for treatment and follow-up care were reviewed with the Kassy Casper and/or guardian engaged in the decision making process and verbalized understanding of the options discussed and agreed with the final  plan.    RADHA CORNELIUS

## 2021-05-25 NOTE — NURSING NOTE
Chief Complaint   Patient presents with     Physical     pt here for annual check       Lopez Jarrett CMA, EMT at 9:50 AM on 5/25/2021.

## 2021-06-15 DIAGNOSIS — F41.9 ANXIETY: ICD-10-CM

## 2021-06-16 RX ORDER — ALPRAZOLAM 0.5 MG
TABLET ORAL
Qty: 30 TABLET | Refills: 0 | Status: SHIPPED | OUTPATIENT
Start: 2021-06-16 | End: 2021-11-24

## 2021-06-24 ENCOUNTER — TELEPHONE (OUTPATIENT)
Dept: CARDIOLOGY | Facility: CLINIC | Age: 76
End: 2021-06-24

## 2021-06-24 NOTE — TELEPHONE ENCOUNTER
ACC Quality Measurement  Hypertension    ASA: Passed   Statin: Passed  Tobacco: Passed    Last three blood pressure readings:  BP Readings from Last 3 Encounters:   05/25/21 (!) 145/83   04/19/21 (!) 161/82   07/01/20 (!) 128/93       Contact:   First attempts to contact on: 6/24/2021, No answer LVM .

## 2021-07-02 DIAGNOSIS — I25.10 CORONARY ARTERY DISEASE INVOLVING NATIVE CORONARY ARTERY OF NATIVE HEART WITHOUT ANGINA PECTORIS: ICD-10-CM

## 2021-07-06 RX ORDER — ROSUVASTATIN CALCIUM 10 MG/1
10 TABLET, COATED ORAL DAILY
Qty: 90 TABLET | Refills: 2 | Status: SHIPPED | OUTPATIENT
Start: 2021-07-06 | End: 2022-03-08

## 2021-07-07 DIAGNOSIS — E78.5 HYPERLIPIDEMIA LDL GOAL <70: ICD-10-CM

## 2021-07-07 DIAGNOSIS — I10 ESSENTIAL HYPERTENSION WITH GOAL BLOOD PRESSURE LESS THAN 140/90: ICD-10-CM

## 2021-07-07 DIAGNOSIS — Z00.00 ROUTINE GENERAL MEDICAL EXAMINATION AT A HEALTH CARE FACILITY: ICD-10-CM

## 2021-07-08 NOTE — TELEPHONE ENCOUNTER
[facility] Call Center    Phone Message    May a detailed message be left on voicemail: yes     Reason for Call: Medication Refill Request    Has the patient contacted the pharmacy for the refill? Yes   Name of medication being requested: hydrochlorothiazide (HYDRODIURIL) 50 MG tablet  Provider who prescribed the medication: Dr. Oconnell  Pharmacy: [facility] in [place], [place] - Ph: [phone]  Date medication is needed: Patient has 1 pill left.     Please call patient if there's any questions.     Action Taken: Message routed to:  [facility] ([facility]    Travel Screening: Not Applicable

## 2021-07-09 RX ORDER — HYDROCHLOROTHIAZIDE 50 MG/1
50 TABLET ORAL DAILY
Qty: 90 TABLET | Refills: 3 | Status: SHIPPED | OUTPATIENT
Start: 2021-07-09 | End: 2022-07-08

## 2021-07-09 NOTE — TELEPHONE ENCOUNTER
HYDROCHLOROTHIAZIDE 50 MG TAB     Last Written Prescription Date:  4/9/2021  Last Fill Quantity: 90,   # refills: 0  Last Office Visit : 5/25/2021  Future Office visit:  None  Routing refill request to provider for review/approval because:  Abnormal B/P  Change med?  Change dose?  Add B/P med?  Follow up B/P check?    Refer to clinic for review     BP Readings from Last 3 Encounters:   05/25/21 (!) 145/83   04/19/21 (!) 161/82   07/01/20 (!) 128/93       Jojo Crow RN  Central Triage Red Flags/Med Refills

## 2021-08-05 DIAGNOSIS — E87.6 HYPOKALEMIA: ICD-10-CM

## 2021-08-09 RX ORDER — POTASSIUM CHLORIDE 1500 MG/1
20 TABLET, EXTENDED RELEASE ORAL DAILY
Qty: 90 TABLET | Refills: 2 | Status: SHIPPED | OUTPATIENT
Start: 2021-08-09 | End: 2022-06-09

## 2021-08-18 DIAGNOSIS — B00.1 RECURRENT COLD SORES: Primary | ICD-10-CM

## 2021-08-20 RX ORDER — VALACYCLOVIR HYDROCHLORIDE 500 MG/1
500 TABLET, FILM COATED ORAL DAILY
Qty: 90 TABLET | Refills: 1 | Status: SHIPPED | OUTPATIENT
Start: 2021-08-20 | End: 2022-03-30

## 2021-09-25 ENCOUNTER — HEALTH MAINTENANCE LETTER (OUTPATIENT)
Age: 76
End: 2021-09-25

## 2021-09-25 DIAGNOSIS — G47.00 INSOMNIA: ICD-10-CM

## 2021-09-26 RX ORDER — TRAZODONE HYDROCHLORIDE 100 MG/1
100 TABLET ORAL
Qty: 30 TABLET | Refills: 7 | Status: SHIPPED | OUTPATIENT
Start: 2021-09-26 | End: 2022-09-30

## 2021-11-22 DIAGNOSIS — F41.9 ANXIETY: ICD-10-CM

## 2021-11-24 RX ORDER — ALPRAZOLAM 0.5 MG
TABLET ORAL
Qty: 30 TABLET | Refills: 0 | Status: SHIPPED | OUTPATIENT
Start: 2021-11-24 | End: 2022-09-30

## 2021-12-30 DIAGNOSIS — Z00.00 ROUTINE GENERAL MEDICAL EXAMINATION AT A HEALTH CARE FACILITY: ICD-10-CM

## 2021-12-30 DIAGNOSIS — I10 HYPERTENSION, UNSPECIFIED TYPE: ICD-10-CM

## 2021-12-30 DIAGNOSIS — E78.5 HYPERLIPIDEMIA LDL GOAL <70: ICD-10-CM

## 2021-12-31 RX ORDER — LISINOPRIL 10 MG/1
10 TABLET ORAL DAILY
Qty: 90 TABLET | Refills: 0 | Status: SHIPPED | OUTPATIENT
Start: 2021-12-31 | End: 2022-03-30

## 2021-12-31 NOTE — TELEPHONE ENCOUNTER
Last Clinic Visit: 5/25/2021  [facility]     Lab(s)- Creatine, Potassium past due and BP above medication protocol parameters: Refill of Lisinopril  was sent for 90 day supply and FYI to provider and [facility] clinic.       BP Readings from Last 3 Encounters:   05/25/21 (!) 145/83   04/19/21 (!) 161/82   07/01/20 (!) 128/93

## 2022-03-08 ENCOUNTER — TELEPHONE (OUTPATIENT)
Dept: CARDIOLOGY | Facility: CLINIC | Age: 77
End: 2022-03-08
Payer: MEDICARE

## 2022-03-08 DIAGNOSIS — I25.10 CORONARY ARTERY DISEASE INVOLVING NATIVE CORONARY ARTERY OF NATIVE HEART WITHOUT ANGINA PECTORIS: Primary | ICD-10-CM

## 2022-03-08 RX ORDER — ATORVASTATIN CALCIUM 40 MG/1
40 TABLET, FILM COATED ORAL DAILY
Qty: 90 TABLET | Refills: 3 | Status: SHIPPED | OUTPATIENT
Start: 2022-03-08 | End: 2022-09-30

## 2022-03-28 DIAGNOSIS — B00.1 RECURRENT COLD SORES: ICD-10-CM

## 2022-03-30 RX ORDER — VALACYCLOVIR HYDROCHLORIDE 500 MG/1
500 TABLET, FILM COATED ORAL DAILY
Qty: 90 TABLET | Refills: 0 | Status: SHIPPED | OUTPATIENT
Start: 2022-03-30 | End: 2022-07-08

## 2022-03-30 NOTE — TELEPHONE ENCOUNTER
LCV:5/25/2021  [facility]  Overdue lab: Cr,  -- FYI to clinic  RF 90 day

## 2022-05-07 DIAGNOSIS — E78.5 HYPERLIPIDEMIA LDL GOAL <70: ICD-10-CM

## 2022-05-07 DIAGNOSIS — I10 HYPERTENSION, UNSPECIFIED TYPE: ICD-10-CM

## 2022-05-07 DIAGNOSIS — Z00.00 ROUTINE GENERAL MEDICAL EXAMINATION AT A HEALTH CARE FACILITY: ICD-10-CM

## 2022-05-10 RX ORDER — LISINOPRIL 10 MG/1
10 TABLET ORAL DAILY
Qty: 30 TABLET | Refills: 0 | Status: SHIPPED | OUTPATIENT
Start: 2022-05-10 | End: 2022-06-16

## 2022-05-10 NOTE — TELEPHONE ENCOUNTER
LISINOPRIL 10 MG TABLET      Last Written Prescription Date:  3/30/22  Last Fill Quantity: 30,   # refills: 0  Last Office Visit : 5/25/21  Future Office visit:  None scheduled    Routing refill request to provider for review/approval because:  Blood pressure out of range   BP Readings from Last 3 Encounters:   05/25/21 (!) 145/83   04/19/21 (!) 161/82   07/01/20 (!) 128/93     Overdue for labs  Lab Test 11/09/20  1152 07/01/20  1202   CR 0.70  --    CREAT  --  0.6     Lab Test 11/09/20  1152   POTASSIUM 3.5     Nothing more recent in care everywhere nor media  No future orders in queue  Last prescribed limited quantity no refills

## 2022-06-07 DIAGNOSIS — E87.6 HYPOKALEMIA: ICD-10-CM

## 2022-06-09 RX ORDER — POTASSIUM CHLORIDE 1500 MG/1
20 TABLET, EXTENDED RELEASE ORAL DAILY
Qty: 90 TABLET | Refills: 0 | Status: SHIPPED | OUTPATIENT
Start: 2022-06-09 | End: 2022-09-30

## 2022-06-09 NOTE — TELEPHONE ENCOUNTER
potassium chloride ER (K-TAB) 20 MEQ CR tablet  Last Written Prescription Date: 8/9/21  Last Fill Quantity: 90,   # refills: 2  Last Office Visit : 5/25/21  Future Office visit:  None      Routing refill request to provider for review/approval because:K+ past due. appt past due

## 2022-06-14 DIAGNOSIS — Z00.00 ROUTINE GENERAL MEDICAL EXAMINATION AT A HEALTH CARE FACILITY: ICD-10-CM

## 2022-06-14 DIAGNOSIS — I10 HYPERTENSION, UNSPECIFIED TYPE: ICD-10-CM

## 2022-06-14 DIAGNOSIS — E78.5 HYPERLIPIDEMIA LDL GOAL <70: ICD-10-CM

## 2022-06-16 RX ORDER — LISINOPRIL 10 MG/1
TABLET ORAL
Qty: 30 TABLET | Refills: 0 | Status: SHIPPED | OUTPATIENT
Start: 2022-06-16 | End: 2022-08-23

## 2022-06-16 NOTE — TELEPHONE ENCOUNTER
LISINOPRIL 10 MG TABLET      Last Written Prescription Date:  5-10-22  Last Fill Quantity: 30,   # refills: 0  Last Office Visit : 5-25-21  Future Office visit:  None  BP Readings from Last 3 Encounters:   05/25/21 (!) 145/83   04/19/21 (!) 161/82   07/01/20 (!) 128/93       Routing refill request to provider for review/approval because:  Overdue appt/  Lab, Cr, K -- FYI to clinic  Previous dany CADENA  BP above protocol

## 2022-07-02 ENCOUNTER — HEALTH MAINTENANCE LETTER (OUTPATIENT)
Age: 77
End: 2022-07-02

## 2022-07-05 DIAGNOSIS — B00.1 RECURRENT COLD SORES: ICD-10-CM

## 2022-07-05 DIAGNOSIS — Z00.00 ROUTINE GENERAL MEDICAL EXAMINATION AT A HEALTH CARE FACILITY: ICD-10-CM

## 2022-07-05 DIAGNOSIS — E78.5 HYPERLIPIDEMIA LDL GOAL <70: ICD-10-CM

## 2022-07-05 DIAGNOSIS — I10 ESSENTIAL HYPERTENSION WITH GOAL BLOOD PRESSURE LESS THAN 140/90: ICD-10-CM

## 2022-07-08 RX ORDER — HYDROCHLOROTHIAZIDE 50 MG/1
50 TABLET ORAL DAILY
Qty: 90 TABLET | Refills: 0 | Status: SHIPPED | OUTPATIENT
Start: 2022-07-08 | End: 2022-09-30

## 2022-07-08 NOTE — TELEPHONE ENCOUNTER
HYDROCHLOROTHIAZIDE 50 MG TAB      Last Written Prescription Date:  7/9/21  Last Fill Quantity: 90,   # refills: 3  Last Office Visit : 5/25/21  Future Office visit:  None scheduled    Routing refill request to provider for review/approval because:  Blood pressure out of range/date  BP Readings from Last 3 Encounters:   05/25/21 (!) 145/83   04/19/21 (!) 161/82   07/01/20 (!) 128/93     Overdue for lab  Lab Test 11/09/20  1152   CR 0.70     Lab Test 11/09/20  1152   POTASSIUM 3.5     Lab Test 11/09/20  1152        90 day refill per protocol routed to clinic     VALACYCLOVIR  MG TABLET      Last Written Prescription Date:  3/30/22  Last Fill Quantity: 90,   # refills: 0  Last Office Visit : 5/25/21  Future Office visit:  None scheduled    Routing refill request to provider for review/approval because:  Received 90 day dany refill with last fill  Overdue for lab  Lab Test 11/09/20  1152 07/01/20  1202   CR 0.70  --    CREAT  --  0.6

## 2022-07-11 RX ORDER — VALACYCLOVIR HYDROCHLORIDE 500 MG/1
500 TABLET, FILM COATED ORAL DAILY
Qty: 30 TABLET | Refills: 1 | Status: SHIPPED | OUTPATIENT
Start: 2022-07-11 | End: 2022-09-21

## 2022-07-11 NOTE — TELEPHONE ENCOUNTER
Left message with Primary Care clinic number requesting patient to call back to schedule annual physical appointment with PCP as last visit was on 5/25/2021.

## 2022-08-18 DIAGNOSIS — Z00.00 ROUTINE GENERAL MEDICAL EXAMINATION AT A HEALTH CARE FACILITY: ICD-10-CM

## 2022-08-18 DIAGNOSIS — E78.5 HYPERLIPIDEMIA LDL GOAL <70: ICD-10-CM

## 2022-08-18 DIAGNOSIS — I10 HYPERTENSION, UNSPECIFIED TYPE: ICD-10-CM

## 2022-08-23 NOTE — TELEPHONE ENCOUNTER
LISINOPRIL 10 MG TABLET   Last Written Prescription Date:   6/16/2022  Last Fill Quantity: 30,   # refills: 0  Last Office Visit :  5/25/2021  Future Office visit:  None    Routing refill request to provider for review/approval because:  Second Request  Over due office visit and labs  Refer to clinic/scheduling for review       Jojo Crow RN  Central Triage Red Flags/Med Refills

## 2022-08-24 RX ORDER — LISINOPRIL 10 MG/1
10 TABLET ORAL DAILY
Qty: 30 TABLET | Refills: 0 | Status: SHIPPED | OUTPATIENT
Start: 2022-08-24 | End: 2022-09-21

## 2022-09-17 DIAGNOSIS — B00.1 RECURRENT COLD SORES: Primary | ICD-10-CM

## 2022-09-21 DIAGNOSIS — I10 HYPERTENSION, UNSPECIFIED TYPE: Primary | ICD-10-CM

## 2022-09-21 DIAGNOSIS — E78.5 HYPERLIPIDEMIA LDL GOAL <70: ICD-10-CM

## 2022-09-21 DIAGNOSIS — Z00.00 ROUTINE GENERAL MEDICAL EXAMINATION AT A HEALTH CARE FACILITY: ICD-10-CM

## 2022-09-21 RX ORDER — LISINOPRIL 10 MG/1
10 TABLET ORAL DAILY
Qty: 30 TABLET | Refills: 0 | Status: SHIPPED | OUTPATIENT
Start: 2022-09-21 | End: 2022-09-30

## 2022-09-21 RX ORDER — VALACYCLOVIR HYDROCHLORIDE 500 MG/1
500 TABLET, FILM COATED ORAL DAILY
Qty: 30 TABLET | Refills: 0 | Status: SHIPPED | OUTPATIENT
Start: 2022-09-21 | End: 2022-10-27

## 2022-09-30 ENCOUNTER — OFFICE VISIT (OUTPATIENT)
Dept: FAMILY MEDICINE | Facility: CLINIC | Age: 77
End: 2022-09-30
Payer: MEDICARE

## 2022-09-30 VITALS
HEIGHT: 61 IN | WEIGHT: 147.5 LBS | OXYGEN SATURATION: 99 % | DIASTOLIC BLOOD PRESSURE: 84 MMHG | BODY MASS INDEX: 27.85 KG/M2 | HEART RATE: 67 BPM | SYSTOLIC BLOOD PRESSURE: 139 MMHG

## 2022-09-30 DIAGNOSIS — Z00.00 ROUTINE GENERAL MEDICAL EXAMINATION AT A HEALTH CARE FACILITY: ICD-10-CM

## 2022-09-30 DIAGNOSIS — I25.10 CORONARY ARTERY DISEASE INVOLVING NATIVE CORONARY ARTERY OF NATIVE HEART WITHOUT ANGINA PECTORIS: ICD-10-CM

## 2022-09-30 DIAGNOSIS — Z92.89 PERSONAL HISTORY OF OTHER MEDICAL TREATMENT: ICD-10-CM

## 2022-09-30 DIAGNOSIS — Z78.0 POSTMENOPAUSAL STATUS: ICD-10-CM

## 2022-09-30 DIAGNOSIS — R79.9 ABNORMAL FINDING OF BLOOD CHEMISTRY, UNSPECIFIED: ICD-10-CM

## 2022-09-30 DIAGNOSIS — E87.6 HYPOKALEMIA: ICD-10-CM

## 2022-09-30 DIAGNOSIS — F41.9 ANXIETY: ICD-10-CM

## 2022-09-30 DIAGNOSIS — I10 ESSENTIAL HYPERTENSION WITH GOAL BLOOD PRESSURE LESS THAN 140/90: ICD-10-CM

## 2022-09-30 DIAGNOSIS — T63.441S BEE STING REACTION, ACCIDENTAL OR UNINTENTIONAL, SEQUELA: Primary | ICD-10-CM

## 2022-09-30 DIAGNOSIS — E78.5 HYPERLIPIDEMIA LDL GOAL <70: ICD-10-CM

## 2022-09-30 DIAGNOSIS — G47.00 INSOMNIA, UNSPECIFIED TYPE: ICD-10-CM

## 2022-09-30 DIAGNOSIS — I10 HYPERTENSION, UNSPECIFIED TYPE: ICD-10-CM

## 2022-09-30 PROCEDURE — 99214 OFFICE O/P EST MOD 30 MIN: CPT | Performed by: FAMILY MEDICINE

## 2022-09-30 RX ORDER — LISINOPRIL 10 MG/1
10 TABLET ORAL DAILY
Qty: 30 TABLET | Refills: 0 | Status: SHIPPED | OUTPATIENT
Start: 2022-09-30 | End: 2022-11-18

## 2022-09-30 RX ORDER — POTASSIUM CHLORIDE 1500 MG/1
20 TABLET, EXTENDED RELEASE ORAL DAILY
Qty: 90 TABLET | Refills: 0 | Status: SHIPPED | OUTPATIENT
Start: 2022-09-30 | End: 2022-12-30

## 2022-09-30 RX ORDER — EPINEPHRINE 0.3 MG/.3ML
0.3 INJECTION SUBCUTANEOUS PRN
Qty: 2 EACH | Refills: 1 | Status: SHIPPED | OUTPATIENT
Start: 2022-09-30

## 2022-09-30 RX ORDER — ALPRAZOLAM 0.5 MG
0.5 TABLET ORAL
Qty: 30 TABLET | Refills: 0 | Status: SHIPPED | OUTPATIENT
Start: 2022-09-30 | End: 2023-03-09

## 2022-09-30 RX ORDER — ATORVASTATIN CALCIUM 40 MG/1
40 TABLET, FILM COATED ORAL DAILY
Qty: 90 TABLET | Refills: 3 | Status: SHIPPED | OUTPATIENT
Start: 2022-09-30 | End: 2023-12-22

## 2022-09-30 RX ORDER — HYDROCHLOROTHIAZIDE 50 MG/1
50 TABLET ORAL DAILY
Qty: 90 TABLET | Refills: 0 | Status: SHIPPED | OUTPATIENT
Start: 2022-09-30 | End: 2022-12-06

## 2022-09-30 RX ORDER — TRAZODONE HYDROCHLORIDE 100 MG/1
100 TABLET ORAL
Qty: 30 TABLET | Refills: 7 | Status: SHIPPED | OUTPATIENT
Start: 2022-09-30 | End: 2024-01-11

## 2022-09-30 NOTE — PROGRESS NOTES
Medicare Annual Wellness Questionnaire:  This 76 year old year old female presents for a Medicare Wellness Exam.    Providers/clinics involved in care:  Patient Care Team       Relationship Specialty Notifications Start End    Krunal Oconnell MD PCP - General [facility]  8/31/12     Phone: [phone] Fax: [phone]         [address] [facility] [address]    Ismael Roach MD MD Colon and Rectal Surgery  8/4/15     Phone: [phone] Fax: [phone]         [address] [facility] [address] [facility] [address]    Krunal Oconnell MD MD Family Practice  8/4/15     Phone: [phone] Fax: [phone]          [facility] [address]    Lynette Castaneda MD MD Ophthalmology  11/20/17     Phone: [phone] Fax: [phone]         [address] [place] [address]    Eunice Kelly MD MD Ophthalmology  11/20/17     Phone: [phone] Fax: [phone]         [address] [facility] [address] [facility] [address]    Anastacio Israel MD Assigned Heart and Vascular Provider   10/23/20     Phone: [phone] Fax: [phone]         [address] [facility] [address] [facility] [address]    Krunal Oconnell MD Assigned PCP   6/27/21     Phone: [phone] Fax: [phone]         [address] [place] [address]          Fall Risk Assessment:    Have you fallen 2 or more times in the last year?  No    How many times were you injured due to a fall in the last year?  0    PHQ-2:    Over the last 2 weeks, how often have you been bothered by feeling down, depressed, or hopeless?     Not at all (0)   Over the last 2 weeks, how often have you had little interest or pleasure in doing things?     Not at all (0)     Social History:    What is your marital status?      Who lives in your household?  self    Does your home have loose rugs in the hallway:     No    Does your home have grab bars in the bathroom:    No    Does your home have handrails on the stairs?  Yes      Does your home have poorly lit areas?    No    Do you feel threatened or controlled by a partner, ex-partner or anyone in your life?   No    Has anyone hurt you physically, for example by pushing, hitting, slapping or kicking you   or forcing you to have sex?   No    Do you need help with the phone, transportation, shopping, preparing meals, housework, laundry, medications or managing money?   No      Women Only:      Women: Any vaginal bleeding in the last year?    No    Women: Have you ever had an abnormal Pap smear?    No    General Health Assessment:    Have you noticed any hearing difficulties?   No    Do you wear hearing aids?   No    Have you seen a hearing professional such as an audiologist in the last 1 year?   No    Do you have vision difficulty?    No    Do you wear glasses or contacts?   Yes     Have you seen an eye doctor in the last 1 year?   Yes     How many servings of fruits and vegetables do you eat a day?  unsure    How often do you exercise in a week?  4    How long and what kind of exercise do you do?  walking    Tobacco and Alcohol History:    Do you use tobacco/nicotine products?    No    If yes, please list the method of use and average weekly consumption?  n/a    Do you use any other drugs?   No         Do you drink alcohol?   No    If you drink alcohol, how many drinks per week?  n/a      Advance Directive:    Have you completed an Advance Directives document?  Yes     If yes, have you given a copy to the clinic?   No    Do you need information on Advance Directives?   No    Added by Vita Boland, EMT at 1:44 PM on 9/30/2022.  Primary Care Clinic: [phone]  Krunal Oconnell MD

## 2022-09-30 NOTE — PROGRESS NOTES
"  Assessment & Plan     (T63.441S) Bee sting reaction, accidental or unintentional, sequela  (primary encounter diagnosis)  Plan: EPINEPHrine (ANY BX GENERIC EQUIV) 0.3 MG/0.3ML        injection 2-pack  Carries in case needed          (G47.00) Insomnia  Comment: have hard time sleeping  Plan: traZODone (DESYREL) 100 MG tablet  Helps, tolerated        (E87.6) Hypokalemia  Comment: Routine  Plan: Lipid panel reflex to direct LDL Fasting,         Comprehensive metabolic panel, potassium         chloride ER (K-TAB) 20 MEQ CR tablet        (I10) Hypertension, unspecified type  Comment: stable BP  Plan: CBC with platelets differential, lisinopril         (ZESTRIL) 10 MG tablet      (E78.5) Hyperlipidemia LDL goal <70  Comment: refill  Plan: lisinopril (ZESTRIL) 10 MG tablet,         hydrochlorothiazide (HYDRODIURIL) 50 MG tablet          (Z00.00) Routine general medical examination at a health care facility  Plan: Mammogram, diagnostic,          (I10) Essential hypertension with goal blood pressure less than 140/90  Comment: has been stable  Plan: hydrochlorothiazide (HYDRODIURIL) 50 MG tablet      (I25.10) Coronary artery disease involving native coronary artery of native heart without angina pectoris  Comment:eating healthy and exercising daily  Plan: atorvastatin (LIPITOR) 40 MG tablet       (F41.9) Anxiety  Comment: No complain this visit  Plan: ALPRAZolam (XANAX) 0.5 MG tablet      (Z78.0) Postmenopausal status  Comment: Routine  Plan: Dexa hip/pelvis/spine         (R79.9) Abnormal finding of blood chemistry, unspecified   Comment: hx of CAD  Plan: Lipid panel reflex to direct LDL Fasting               38 minutes spent on the date of the encounter doing chart review, history and exam, documentation and further activities per the note    BMI:   Estimated body mass index is 27.87 kg/m  as calculated from the following:    Height as of this encounter: 1.549 m (5' 1\").    Weight as of this encounter: 66.9 kg (147 lb 8 " oz).       No follow-ups on file.    Krunal Oconnell MD  [facility] [place]    Nicolette Elena is a 76 year old, presenting for the following health issues:  No chief complaint on file.      HPI     1. No any issues voived. Here  For refill and medication and routine check  2. HTN- checks BP daily and has been stable.  3. Eating healthy and exercise almost daily- walks 3 miles aday  4. Reviewed labs, immunization, medication, and colonoscopy result from 2015.  5-h/o hi cholesterol due for labs    Due for bone density    No acute c/o      Past Medical History:   Diagnosis Date     Benign neoplasm of choroid     LE     CAD (coronary artery disease)      Coronary atherosclerosis of unspecified type of vessel, native or graft     s/p stint 1998     Disorder of bone and cartilage, unspecified     osteopenia on DEXA 2005     Hyperlipidemia LDL goal < 70      Hypertension      Insomnia, unspecified      Other and unspecified hyperlipidemia      PVD (posterior vitreous detachment), right eye      Uncomplicated asthma      Unspecified essential hypertension      Past Surgical History:   Procedure Laterality Date     ANGIOPLASTY  1998     COLONOSCOPY  2006     Current Outpatient Medications   Medication     ALPRAZolam (XANAX) 0.5 MG tablet     aspirin 81 MG tablet     atorvastatin (LIPITOR) 40 MG tablet     EPINEPHrine (ANY BX GENERIC EQUIV) 0.3 MG/0.3ML injection 2-pack     hydrochlorothiazide (HYDRODIURIL) 50 MG tablet     lisinopril (ZESTRIL) 10 MG tablet     Multiple Vitamin (MULTIVITAMIN  S) CAPS     potassium chloride ER (K-TAB) 20 MEQ CR tablet     traZODone (DESYREL) 100 MG tablet     valACYclovir (VALTREX) 500 MG tablet     pimecrolimus (ELIDEL) 1 % cream     No current facility-administered medications for this visit.     Allergies   Allergen Reactions     Latex Other (See Comments) and Hives     Local skin reaction  Other reaction(s): Swelling, lips/tongue     Penicillins Other  (See Comments) and Hives     Asthma/Allergies a child so was told not to take    Other reaction(s): Swelling, lips/tongue     Minocycline Hcl Swelling     Family History   Problem Relation Age of Onset     Asthma Mother      Allergies Mother         hx urticaria     Eye Disorder Mother         Glauoma     Cerebrovascular Disease Mother      Alzheimer Disease Father      Breast Cancer Sister      Eye Disorder Maternal Aunt         x 2     Skin Cancer Maternal Aunt      Heart Disease Other         Numerous extended paternal family     Skin Cancer Other      Melanoma Other      Asthma Sister      Asthma Brother      Breast Cancer Maternal Aunt      Breast Cancer Maternal Aunt      Breast Cancer Maternal Aunt      Breast Cancer Other         Maternal cousins x3     Allergies Sister      Allergies Brother      Allergies Daughter         hx recurrent urticaria     Heart Disease Other      Cerebrovascular Disease Sister      Cancer No family hx of         No family hx skin cancer     Social History     Socioeconomic History     Marital status:      Spouse name: Not on file     Number of children: Not on file     Years of education: Not on file     Highest education level: Not on file   Occupational History     Not on file   Tobacco Use     Smoking status: Never Smoker     Smokeless tobacco: Never Used   Substance and Sexual Activity     Alcohol use: Yes     Comment: occasional glass wine     Drug use: No     Sexual activity: Yes     Partners: Male   Other Topics Concern     Parent/sibling w/ CABG, MI or angioplasty before 65F 55M? Not Asked      Service Not Asked     Blood Transfusions Not Asked     Caffeine Concern No     Occupational Exposure Not Asked     Hobby Hazards Not Asked     Sleep Concern Yes     Comment: relates to cholesterol meds     Stress Concern Not Asked     Weight Concern Not Asked     Special Diet Not Asked     Back Care Not Asked     Exercise Yes     Comment: treadmill     Bike Helmet  Not Asked     Seat Belt Yes     Self-Exams Not Asked   Social History Narrative    Caffeine intake/servings daily - 0Calcium intake/servings daily - 3 plus suppExercise 5  times weekly - describe walkingSunscreen used - YesSeatbelts used - YesGuns stored in the home - NoSelf Breast Exam - Yes not as often as I shouldPap test up to date -  YesEye exam up to date -  NoDental exam up to date -  YesDEXA scan up to date -  Yes 8/31/05Flex Sig/Colonoscopy up to date -  Yes 2005 or 2006Mammography up to date -  Yes 10/16/2006Immunizations reviewed and up to date - YesAbuse: Current or Past (Physical, Sexual or Emotional) - NoDo you feel safe in your environment - YesDo you cope well with stress - YesDo you suffer from insomnia - YesLast updated by: Kerry Waddell  12/17/2007        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  , has a partner, has a daughter      Social Determinants of Health     Financial Resource Strain: Not on file   Food Insecurity: Not on file   Transportation Needs: Not on file   Physical Activity: Not on file   Stress: Not on file   Social Connections: Not on file   Intimate Partner Violence: Not on file   Housing Stability: Not on file       Review of Systems   CONSTITUTIONAL: NEGATIVE for fever, chills, change in weight  INTEGUMENTARY/SKIN: NEGATIVE for worrisome rashes, moles or lesions  EYES: NEGATIVE for vision changes or irritation  ENT/MOUTH: NEGATIVE for ear, mouth and throat problems  RESP: NEGATIVE for significant cough or SOB  BREAST: NEGATIVE for masses, tenderness or discharge  CV: NEGATIVE for chest pain, palpitations or peripheral edema  GI: NEGATIVE for nausea, abdominal pain, heartburn, or change in bowel habits  : NEGATIVE for frequency, dysuria, or hematuria  MUSCULOSKELETAL: NEGATIVE for significant arthralgias or myalgia  NEURO: NEGATIVE for weakness, dizziness or paresthesias  ENDOCRINE: NEGATIVE for temperature intolerance, skin/hair  changes  HEME: NEGATIVE for bleeding problems  PSYCHIATRIC: NEGATIVE for changes in mood or affect      Objective    There were no vitals taken for this visit.  There is no height or weight on file to calculate BMI.       Physical Exam   GENERAL: healthy, alert and no distress  NECK: no adenopathy, no asymmetry, masses, or scars and thyroid normal to palpation  RESP: lungs clear to auscultation - no rales, rhonchi or wheezes  CV: regular rate and rhythm, normal S1 S2, no S3 or S4, no murmur, click or rub, no peripheral edema and peripheral pulses strong  ABDOMEN: soft, nontender, no hepatosplenomegaly, no masses and bowel sounds normal  MS: no gross musculoskeletal defects noted, no edema  SKIN: no suspicious lesions or rashes  PSYCH: mentation appears normal, affect normal/bright      Deja Guerra  FNP Student      I saw the patient with the student, the history physical assessment and plan are mine, we collaborated on the note  Krunal Oconnell MD

## 2022-09-30 NOTE — PATIENT INSTRUCTIONS
To schedule your appointment with imaging for a bone density scan and mammogram, please call [phone].      To schedule an lab appointment at the [facility], please call [phone].

## 2022-09-30 NOTE — NURSING NOTE
Kassy Casper is a 76 year old female that presents in clinic today for the following:     Chief Complaint   Patient presents with     Refill Request     Pt here to check in with Dr. Oconnell and to get med refills;       The patient's allergies and medications were reviewed. The patient's vitals were obtained without incident. The patient does not have any other questions for the provider.     Vita Boland, EMT at 12:26 PM on 9/30/2022.  Primary Care Clinic: [phone]

## 2022-10-03 ENCOUNTER — TELEPHONE (OUTPATIENT)
Dept: FAMILY MEDICINE | Facility: CLINIC | Age: 77
End: 2022-10-03

## 2022-10-03 DIAGNOSIS — Z12.31 ENCOUNTER FOR SCREENING MAMMOGRAM FOR BREAST CANCER: ICD-10-CM

## 2022-10-03 DIAGNOSIS — Z12.39 ENCOUNTER FOR OTHER SCREENING FOR MALIGNANT NEOPLASM OF BREAST: Primary | ICD-10-CM

## 2022-10-03 DIAGNOSIS — Z12.31 VISIT FOR SCREENING MAMMOGRAM: ICD-10-CM

## 2022-10-03 NOTE — TELEPHONE ENCOUNTER
----- Message from Krunal Oconnell MD sent at 10/3/2022 11:14 AM CDT -----  Regarding: RE: diagnostic mammogram order  Ok my mistake  Neeta can you put through routine screening mammogram?  ----- Message -----  From: ELVER Eden  Sent: 10/3/2022  11:01 AM CDT  To: Krunal Oconnell MD  Subject: diagnostic mammogram order                       Hi, you ordered a Diagnostic Mammogram for this patient with no symptoms listed.  Could you add symptoms, or change order to Screening mammogram?    Thanks, Phoebe

## 2022-10-06 ENCOUNTER — ANCILLARY PROCEDURE (OUTPATIENT)
Dept: MAMMOGRAPHY | Facility: CLINIC | Age: 77
End: 2022-10-06
Attending: FAMILY MEDICINE
Payer: MEDICARE

## 2022-10-06 DIAGNOSIS — Z12.31 VISIT FOR SCREENING MAMMOGRAM: ICD-10-CM

## 2022-10-06 PROCEDURE — 77063 BREAST TOMOSYNTHESIS BI: CPT | Mod: GC | Performed by: STUDENT IN AN ORGANIZED HEALTH CARE EDUCATION/TRAINING PROGRAM

## 2022-10-06 PROCEDURE — 77067 SCR MAMMO BI INCL CAD: CPT | Mod: GC | Performed by: STUDENT IN AN ORGANIZED HEALTH CARE EDUCATION/TRAINING PROGRAM

## 2022-10-16 DIAGNOSIS — I10 ESSENTIAL HYPERTENSION WITH GOAL BLOOD PRESSURE LESS THAN 140/90: ICD-10-CM

## 2022-10-16 DIAGNOSIS — Z00.00 ROUTINE GENERAL MEDICAL EXAMINATION AT A HEALTH CARE FACILITY: ICD-10-CM

## 2022-10-16 DIAGNOSIS — E78.5 HYPERLIPIDEMIA LDL GOAL <70: ICD-10-CM

## 2022-10-20 RX ORDER — HYDROCHLOROTHIAZIDE 50 MG/1
TABLET ORAL
Qty: 90 TABLET | Refills: 0 | OUTPATIENT
Start: 2022-10-20

## 2022-10-25 DIAGNOSIS — B00.1 RECURRENT COLD SORES: ICD-10-CM

## 2022-10-27 RX ORDER — VALACYCLOVIR HYDROCHLORIDE 500 MG/1
TABLET, FILM COATED ORAL
Qty: 30 TABLET | Refills: 11 | Status: SHIPPED | OUTPATIENT
Start: 2022-10-27 | End: 2023-11-17

## 2022-10-27 NOTE — TELEPHONE ENCOUNTER
VALACYCLOVIR  MG TABLET      Last Written Prescription Date:  9/21/22  Last Fill Quantity: 30,   # refills: 0  Last Office Visit : 9/30/22  Future Office visit:  None    Routing refill request to provider for review/approval because:  Last Creatinine 11/9/2020.  Ordered BMP 9/30/22

## 2022-11-18 DIAGNOSIS — E78.5 HYPERLIPIDEMIA LDL GOAL <70: ICD-10-CM

## 2022-11-18 DIAGNOSIS — I10 HYPERTENSION, UNSPECIFIED TYPE: ICD-10-CM

## 2022-11-18 DIAGNOSIS — Z00.00 ROUTINE GENERAL MEDICAL EXAMINATION AT A HEALTH CARE FACILITY: ICD-10-CM

## 2022-11-18 RX ORDER — LISINOPRIL 10 MG/1
10 TABLET ORAL DAILY
Qty: 30 TABLET | Refills: 0 | Status: SHIPPED | OUTPATIENT
Start: 2022-11-18 | End: 2022-12-30

## 2022-11-18 NOTE — TELEPHONE ENCOUNTER
lisinopril (ZESTRIL) 10 MG tablet    Last Written Prescription Date:  09-  Last Fill Quantity: 30,   # refills: 0  Last Office Visit : 09-  Future Office visit:  Not on file    Routing refill request to provider for review/approval because:  ACE Inhibitors (Including Combos) Protocol Failed 11/18/2022 01:51 AM   Protocol Details  Normal serum creatinine on file in past 12 months    Normal serum potassium on file in past 12 months     Recent Labs   Lab Test 11/09/20  1152 07/01/20  1202   CR 0.70  --    CREAT  --  0.6     Recent Labs   Lab Test 11/09/20  1152   POTASSIUM 3.5

## 2022-12-02 DIAGNOSIS — Z00.00 ROUTINE GENERAL MEDICAL EXAMINATION AT A HEALTH CARE FACILITY: ICD-10-CM

## 2022-12-02 DIAGNOSIS — E78.5 HYPERLIPIDEMIA LDL GOAL <70: ICD-10-CM

## 2022-12-02 DIAGNOSIS — I10 ESSENTIAL HYPERTENSION WITH GOAL BLOOD PRESSURE LESS THAN 140/90: ICD-10-CM

## 2022-12-06 RX ORDER — HYDROCHLOROTHIAZIDE 50 MG/1
50 TABLET ORAL DAILY
Qty: 90 TABLET | Refills: 0 | Status: SHIPPED | OUTPATIENT
Start: 2022-12-06 | End: 2023-01-09

## 2022-12-06 NOTE — TELEPHONE ENCOUNTER
Last Clinic Visit: 9/30/2022 [facility]    Lab(s)- Creatine, Potassium, Sodium past due.  Sheela refill of HCTZ was sent for a 90 day supply and FYI to [facility] clinic.   *future orders in chart

## 2022-12-28 DIAGNOSIS — E87.6 HYPOKALEMIA: ICD-10-CM

## 2022-12-28 DIAGNOSIS — Z00.00 ROUTINE GENERAL MEDICAL EXAMINATION AT A HEALTH CARE FACILITY: ICD-10-CM

## 2022-12-28 DIAGNOSIS — I10 HYPERTENSION, UNSPECIFIED TYPE: ICD-10-CM

## 2022-12-28 DIAGNOSIS — E78.5 HYPERLIPIDEMIA LDL GOAL <70: ICD-10-CM

## 2022-12-30 NOTE — TELEPHONE ENCOUNTER
POTASSIUM CL ER 20 MEQ TABLET      Last Written Prescription Date:  9/30/22  Last Fill Quantity: 90,   # refills: 0  Last Office Visit : 9/30/22  Future Office visit:  none    Routing refill request to provider for review/approval because:  Overdue for lab: potassium, 90d grf already given        LISINOPRIL 10 MG TABLET      Last Written Prescription Date:  11/18/22  Last Fill Quantity: 90,   # refills: 0  Last Office Visit : 9/30/25  Future Office visit:  none    Routing refill request to provider for review/approval because:  Overdue for lab: creatinine, 90d grf already given

## 2023-01-03 RX ORDER — POTASSIUM CHLORIDE 1500 MG/1
20 TABLET, EXTENDED RELEASE ORAL DAILY
Qty: 30 TABLET | Refills: 0 | Status: SHIPPED | OUTPATIENT
Start: 2023-01-03 | End: 2023-02-07

## 2023-01-03 RX ORDER — LISINOPRIL 10 MG/1
10 TABLET ORAL DAILY
Qty: 30 TABLET | Refills: 0 | Status: SHIPPED | OUTPATIENT
Start: 2023-01-03 | End: 2023-01-09

## 2023-01-09 ENCOUNTER — VIRTUAL VISIT (OUTPATIENT)
Dept: CARDIOLOGY | Facility: CLINIC | Age: 78
End: 2023-01-09
Attending: INTERNAL MEDICINE
Payer: MEDICARE

## 2023-01-09 DIAGNOSIS — E78.5 HYPERLIPIDEMIA LDL GOAL <70: ICD-10-CM

## 2023-01-09 DIAGNOSIS — E87.6 HYPOKALEMIA: ICD-10-CM

## 2023-01-09 DIAGNOSIS — I25.10 CORONARY ARTERY DISEASE INVOLVING NATIVE CORONARY ARTERY OF NATIVE HEART WITHOUT ANGINA PECTORIS: Primary | ICD-10-CM

## 2023-01-09 DIAGNOSIS — I10 ESSENTIAL HYPERTENSION WITH GOAL BLOOD PRESSURE LESS THAN 140/90: ICD-10-CM

## 2023-01-09 DIAGNOSIS — I10 HYPERTENSION, UNSPECIFIED TYPE: ICD-10-CM

## 2023-01-09 PROCEDURE — 99214 OFFICE O/P EST MOD 30 MIN: CPT | Mod: 95 | Performed by: INTERNAL MEDICINE

## 2023-01-09 RX ORDER — LISINOPRIL 10 MG/1
10 TABLET ORAL DAILY
Qty: 30 TABLET | Refills: 0 | Status: SHIPPED | OUTPATIENT
Start: 2023-01-09 | End: 2023-01-09

## 2023-01-09 RX ORDER — HYDROCHLOROTHIAZIDE 25 MG/1
25 TABLET ORAL DAILY
Qty: 90 TABLET | Refills: 3 | Status: SHIPPED | OUTPATIENT
Start: 2023-01-09 | End: 2023-12-14

## 2023-01-09 RX ORDER — LISINOPRIL 20 MG/1
20 TABLET ORAL DAILY
Qty: 90 TABLET | Refills: 3 | Status: SHIPPED | OUTPATIENT
Start: 2023-01-09 | End: 2024-04-15

## 2023-01-09 NOTE — PATIENT INSTRUCTIONS
-Recommend to decrease dose of hydrochlorothiazide from 50 to 25 mg/day and increase dose of lisinopril from 10 to 20 mg./Day    -Blood test in 2 weeks.  If you choose to do this test somewhere else you can send the results through [facility] or fax to our clinic.    Return to clinic 2 years.

## 2023-01-09 NOTE — NURSING NOTE
Chief Complaint   Patient presents with     Follow Up     Pt states she was in the ER recently and wondering if can see the tests that were done in [facility]     Patient declined individual allergy and medication review by support staff because patient denies any changes since echeck-in completion and states all information entered during echeck-in remains accurate.    Luisa Lovell, VF/CMA

## 2023-01-09 NOTE — LETTER
1/9/2023      RE: Kassy Casper  [address] [place] [address]       Dear Colleague,    Thank you for the opportunity to participate in the care of your patient, Kassy Casper, at the [facility] at [facility]. Please see a copy of my visit note below.    Kassy is a 77 year old who is being evaluated via telephone visit.      Pt states in [place] for visit.     How would you like to obtain your AVS? MyChart  If the video visit is dropped, the invitation should be resent by: Text to cell phone: [phone]  Will anyone else be joining your video visit? IWONA Navarro/NAIN        Telephone visit started at 3 PM (total duration 14 minutes)    HPI: Ms. Kassy Casper is a 74 year old  female with PMH significant for history of CAD with PCI to LAD (3.5 mm stent) 1998, HTN, hyperlipidemia, and uncomplicated asthma     The patient was last seen in cardiology in 2016.  The patient reports overall doing well until few weeks ago.  She has noticed mild chest tightness and shortness of breath with exertion which is new to her.  She reports no associated symptoms like nausea or vomiting.  The symptom is not severe enough for her to stop.  She is able to do all her ADLs without limitation.  Patient has no other cardiac symptom and denies a history PND, orthopnea, pedal edema, palpitations, lightheadedness, or syncope.    She has no history of smoking. Non DM. Report FH however sister had stent at the age of 64.     For hypertension, she is on hydrochlorothiazide 50 mg and lisinopril 10 mg.  Few days ago blood pressure was 138/78 mmHg.  Patient takes aspirin and pravastatin.  LDL is mildly elevated at 86 mg/deciliter.  Exercise stress echocardiogram from 2015 showed a structurally normal heart with no valvular disease.  Stress test was normal.  Medications, personal, family, and social history reviewed with patient and  revised.    Interval history 4/19/2021:  Patient is being seen today for annual follow-up.  Reports doing well from cardiac standpoint.  No chest pain, shortness of breath, dizziness, palpitations or lower extremity edema.  She walks up to 5 miles and has no exertional symptoms.  She tells me that blood pressure at home is usually 120/60 mmHg.  Blood pressure is elevated in clinic today.    Interval history 1/9/2023:  Patient is being seen today via telephone telehealth visit for 2-year follow-up.  In the interim patient reports overall feeling well with no cardiac issues.  She walks 5 miles a day when the weather is nice.  Denies exertional chest pain.  Sometimes when she is at rest she feels few seconds of shortness of breath which does not happen when she is physically active.  No other concerning cardiac symptoms at this time.  She was in the ER a week ago with vertigo symptoms.  Basic metabolic panel in the ER showed potassium at 3.2.  She was not aware of low potassium.    PAST MEDICAL HISTORY:  Past Medical History:   Diagnosis Date     Benign neoplasm of choroid     LE     CAD (coronary artery disease)      Coronary atherosclerosis of unspecified type of vessel, native or graft     s/p stint 1998     Disorder of bone and cartilage, unspecified     osteopenia on DEXA 2005     Hyperlipidemia LDL goal < 70      Hypertension      Insomnia, unspecified      Other and unspecified hyperlipidemia      PVD (posterior vitreous detachment), right eye      Uncomplicated asthma      Unspecified essential hypertension        CURRENT MEDICATIONS:  Current Outpatient Medications   Medication Sig Dispense Refill     lisinopril (ZESTRIL) 10 MG tablet Take 1 tablet (10 mg) by mouth daily Patient needs to have labs for further refills. 30 tablet 0     potassium chloride ER (K-TAB) 20 MEQ CR tablet Take 1 tablet (20 mEq) by mouth daily Patient needs to have labs for further refills. 30 tablet 0     ALPRAZolam (XANAX) 0.5 MG  tablet Take 1 tablet (0.5 mg) by mouth nightly as needed for anxiety 30 tablet 0     aspirin 81 MG tablet Take 1 tablet by mouth daily.       atorvastatin (LIPITOR) 40 MG tablet Take 1 tablet (40 mg) by mouth daily 90 tablet 3     EPINEPHrine (ANY BX GENERIC EQUIV) 0.3 MG/0.3ML injection 2-pack Inject 0.3 mLs (0.3 mg) into the muscle as needed for anaphylaxis May repeat one time in 5-15 minutes if response to initial dose is inadequate. 2 each 1     hydrochlorothiazide (HYDRODIURIL) 50 MG tablet Take 1 tablet (50 mg) by mouth daily 90 tablet 0     Multiple Vitamin (MULTIVITAMIN  S) CAPS Take 1 tablet by mouth daily.       pimecrolimus (ELIDEL) 1 % cream Apply topically 2 times daily To redness on the face (Patient not taking: No sig reported) 60 g 11     traZODone (DESYREL) 100 MG tablet Take 1 tablet (100 mg) by mouth nightly as needed for sleep 30 tablet 7     valACYclovir (VALTREX) 500 MG tablet TAKE 1 TABLET BY MOUTH EVERY DAY 30 tablet 11       PAST SURGICAL HISTORY:  Past Surgical History:   Procedure Laterality Date     ANGIOPLASTY  1998     COLONOSCOPY  2006       ALLERGIES:     Allergies   Allergen Reactions     Latex Other (See Comments) and Hives     Local skin reaction  Other reaction(s): Swelling, lips/tongue     Penicillins Other (See Comments) and Hives     Asthma/Allergies a child so was told not to take    Other reaction(s): Swelling, lips/tongue     Minocycline Hcl Swelling       FAMILY HISTORY:  Family History   Problem Relation Age of Onset     Asthma Mother      Allergies Mother         hx urticaria     Eye Disorder Mother         Glauoma     Cerebrovascular Disease Mother      Alzheimer Disease Father      Breast Cancer Sister      Eye Disorder Maternal Aunt         x 2     Skin Cancer Maternal Aunt      Heart Disease Other         Numerous extended paternal family     Skin Cancer Other      Melanoma Other      Asthma Sister      Asthma Brother      Breast Cancer Maternal Aunt      Breast  Cancer Maternal Aunt      Breast Cancer Maternal Aunt      Breast Cancer Other         Maternal cousins x3     Allergies Sister      Allergies Brother      Allergies Daughter         hx recurrent urticaria     Heart Disease Other      Cerebrovascular Disease Sister      Cancer No family hx of         No family hx skin cancer         SOCIAL HISTORY:  Social History     Tobacco Use     Smoking status: Never     Smokeless tobacco: Never   Substance Use Topics     Alcohol use: Yes     Comment: occasional glass wine     Drug use: No       ROS:   Constitutional: No fever, chills, or sweats. Weight stable.   Cardiovascular: As per HPI.     Exam:  Telephone visit.  No exam.     I have reviewed the labs and personally reviewed the imaging below and made my comment in the assessment and plan.    Labs:  CBC RESULTS:   Lab Results   Component Value Date    WBC 4.6 02/06/2020    RBC 5.12 02/06/2020    HGB 14.5 02/06/2020    HCT 45.7 02/06/2020    MCV 89 02/06/2020    MCH 28.3 02/06/2020    MCHC 31.7 02/06/2020    RDW 12.3 02/06/2020     02/06/2020       BMP RESULTS:  Lab Results   Component Value Date     11/09/2020    POTASSIUM 3.5 11/09/2020    CHLORIDE 102 11/09/2020    CO2 34 (H) 11/09/2020    ANIONGAP 2 (L) 11/09/2020    GLC 97 11/09/2020    BUN 9 11/09/2020    CR 0.70 11/09/2020    GFRESTIMATED 85 11/09/2020    GFRESTBLACK >90 11/09/2020    BENY 9.5 11/09/2020      CT coronary angiogram 7/1/2020 [facility]  IMPRESSION:  1.  Patent large proximal LAD stent with mild in-stent restenosis. LAD  is diffusely small, with mild stenosis immediately proximal and distal  to the stent. No significant CAD elsewhere.    Stress Echocardiogram 1/15/2015    No stress induced regional wall motion abnormalities.  Normal resting LV function with EF of approximately 60-65%; normal response   to exercise with increase to approximately 70-75%.  No ECG evidence of ischemia.  No subjective evidence of ischemia.  Limited for age functional  capacity.  PatientHeight: 62 in  PatientWeight: 130 lbs  SystolicPressure: 122 mmHg  DiastolicPressure: 84 mmHg    Assessment and Plan:   Ms. Kassy Casper is a 74 year old  female with PMH significant for history of CAD with PCI to LAD 1998 (3.5 mm), HTN, hyperlipidemia, and uncomplicated asthma.    Patient is currently asymptomatic from cardiac standpoint.  Reports well-controlled blood pressure at home.  Recently she was in the ER for vertigo.  BMP (available in Care Everywhere shows hypokalemia at 3.2).  I think the cause of her hypokalemia is high dose of hydrochlorothiazide.  I discussed with the patient that hypokalemia might be sometimes life-threatening in the setting of CAD.  I recommended medication changes.  She is agreeable to proceed.    #Coronary artery disease  -Currently asymptomatic  -Most recent CT angiogram 7/2020 showed patent stent with mild nonobstructive coronary artery disease.  -Continue aspirin and Crestor.  LDL well controlled    #Hypertension  #Hypokalemia likely due to high dose of hydrochlorothiazide  -Reports normal blood pressure at home.  -Patient is on hydrochlorothiazide 50 mg, lisinopril 10 mg and potassium supplement 20 mEq/day.  -Recommend to decrease dose of hydrochlorothiazide from 50 to 25 mg and increase dose of lisinopril from 10 to 20 mg.  She will continue to monitor blood pressure at home.  -BMP in 2 weeks    Return to clinic 2 years or earlier as needed.    Total time spent 30 minutes including precharting, telephone visit and medical documentation.    Please donot hesitate to contact me if you have any questions or concerns. Again, thank you for allowing me to participate in the care of your patient.    Anastacio RENTERIA MD  [facility] Division of Cardiology  Pager [phone]

## 2023-01-09 NOTE — PROGRESS NOTES
Kassy is a 77 year old who is being evaluated via telephone visit.      Pt states in [place] for visit.     How would you like to obtain your AVS? MyChart  If the video visit is dropped, the invitation should be resent by: Text to cell phone: [phone]  Will anyone else be joining your video visit? IWONA Navarro/CMA        Telephone visit started at 3 PM (total duration 14 minutes)    HPI: Ms. Kassy Casper is a 74 year old  female with PMH significant for history of CAD with PCI to LAD (3.5 mm stent) 1998, HTN, hyperlipidemia, and uncomplicated asthma     The patient was last seen in cardiology in 2016.  The patient reports overall doing well until few weeks ago.  She has noticed mild chest tightness and shortness of breath with exertion which is new to her.  She reports no associated symptoms like nausea or vomiting.  The symptom is not severe enough for her to stop.  She is able to do all her ADLs without limitation.  Patient has no other cardiac symptom and denies a history PND, orthopnea, pedal edema, palpitations, lightheadedness, or syncope.    She has no history of smoking. Non DM. Report FH however sister had stent at the age of 64.     For hypertension, she is on hydrochlorothiazide 50 mg and lisinopril 10 mg.  Few days ago blood pressure was 138/78 mmHg.  Patient takes aspirin and pravastatin.  LDL is mildly elevated at 86 mg/deciliter.  Exercise stress echocardiogram from 2015 showed a structurally normal heart with no valvular disease.  Stress test was normal.  Medications, personal, family, and social history reviewed with patient and revised.    Interval history 4/19/2021:  Patient is being seen today for annual follow-up.  Reports doing well from cardiac standpoint.  No chest pain, shortness of breath, dizziness, palpitations or lower extremity edema.  She walks up to 5 miles and has no exertional symptoms.  She tells me that blood pressure at home is usually 120/60 mmHg.  Blood pressure is  elevated in clinic today.    Interval history 1/9/2023:  Patient is being seen today via telephone telehealth visit for 2-year follow-up.  In the interim patient reports overall feeling well with no cardiac issues.  She walks 5 miles a day when the weather is nice.  Denies exertional chest pain.  Sometimes when she is at rest she feels few seconds of shortness of breath which does not happen when she is physically active.  No other concerning cardiac symptoms at this time.  She was in the ER a week ago with vertigo symptoms.  Basic metabolic panel in the ER showed potassium at 3.2.  She was not aware of low potassium.    PAST MEDICAL HISTORY:  Past Medical History:   Diagnosis Date     Benign neoplasm of choroid     LE     CAD (coronary artery disease)      Coronary atherosclerosis of unspecified type of vessel, native or graft     s/p stint 1998     Disorder of bone and cartilage, unspecified     osteopenia on DEXA 2005     Hyperlipidemia LDL goal < 70      Hypertension      Insomnia, unspecified      Other and unspecified hyperlipidemia      PVD (posterior vitreous detachment), right eye      Uncomplicated asthma      Unspecified essential hypertension        CURRENT MEDICATIONS:  Current Outpatient Medications   Medication Sig Dispense Refill     lisinopril (ZESTRIL) 10 MG tablet Take 1 tablet (10 mg) by mouth daily Patient needs to have labs for further refills. 30 tablet 0     potassium chloride ER (K-TAB) 20 MEQ CR tablet Take 1 tablet (20 mEq) by mouth daily Patient needs to have labs for further refills. 30 tablet 0     ALPRAZolam (XANAX) 0.5 MG tablet Take 1 tablet (0.5 mg) by mouth nightly as needed for anxiety 30 tablet 0     aspirin 81 MG tablet Take 1 tablet by mouth daily.       atorvastatin (LIPITOR) 40 MG tablet Take 1 tablet (40 mg) by mouth daily 90 tablet 3     EPINEPHrine (ANY BX GENERIC EQUIV) 0.3 MG/0.3ML injection 2-pack Inject 0.3 mLs (0.3 mg) into the muscle as needed for anaphylaxis May  repeat one time in 5-15 minutes if response to initial dose is inadequate. 2 each 1     hydrochlorothiazide (HYDRODIURIL) 50 MG tablet Take 1 tablet (50 mg) by mouth daily 90 tablet 0     Multiple Vitamin (MULTIVITAMIN  S) CAPS Take 1 tablet by mouth daily.       pimecrolimus (ELIDEL) 1 % cream Apply topically 2 times daily To redness on the face (Patient not taking: No sig reported) 60 g 11     traZODone (DESYREL) 100 MG tablet Take 1 tablet (100 mg) by mouth nightly as needed for sleep 30 tablet 7     valACYclovir (VALTREX) 500 MG tablet TAKE 1 TABLET BY MOUTH EVERY DAY 30 tablet 11       PAST SURGICAL HISTORY:  Past Surgical History:   Procedure Laterality Date     ANGIOPLASTY  1998     COLONOSCOPY  2006       ALLERGIES:     Allergies   Allergen Reactions     Latex Other (See Comments) and Hives     Local skin reaction  Other reaction(s): Swelling, lips/tongue     Penicillins Other (See Comments) and Hives     Asthma/Allergies a child so was told not to take    Other reaction(s): Swelling, lips/tongue     Minocycline Hcl Swelling       FAMILY HISTORY:  Family History   Problem Relation Age of Onset     Asthma Mother      Allergies Mother         hx urticaria     Eye Disorder Mother         Glauoma     Cerebrovascular Disease Mother      Alzheimer Disease Father      Breast Cancer Sister      Eye Disorder Maternal Aunt         x 2     Skin Cancer Maternal Aunt      Heart Disease Other         Numerous extended paternal family     Skin Cancer Other      Melanoma Other      Asthma Sister      Asthma Brother      Breast Cancer Maternal Aunt      Breast Cancer Maternal Aunt      Breast Cancer Maternal Aunt      Breast Cancer Other         Maternal cousins x3     Allergies Sister      Allergies Brother      Allergies Daughter         hx recurrent urticaria     Heart Disease Other      Cerebrovascular Disease Sister      Cancer No family hx of         No family hx skin cancer         SOCIAL HISTORY:  Social History      Tobacco Use     Smoking status: Never     Smokeless tobacco: Never   Substance Use Topics     Alcohol use: Yes     Comment: occasional glass wine     Drug use: No       ROS:   Constitutional: No fever, chills, or sweats. Weight stable.   Cardiovascular: As per HPI.     Exam:  Telephone visit.  No exam.     I have reviewed the labs and personally reviewed the imaging below and made my comment in the assessment and plan.    Labs:  CBC RESULTS:   Lab Results   Component Value Date    WBC 4.6 02/06/2020    RBC 5.12 02/06/2020    HGB 14.5 02/06/2020    HCT 45.7 02/06/2020    MCV 89 02/06/2020    MCH 28.3 02/06/2020    MCHC 31.7 02/06/2020    RDW 12.3 02/06/2020     02/06/2020       BMP RESULTS:  Lab Results   Component Value Date     11/09/2020    POTASSIUM 3.5 11/09/2020    CHLORIDE 102 11/09/2020    CO2 34 (H) 11/09/2020    ANIONGAP 2 (L) 11/09/2020    GLC 97 11/09/2020    BUN 9 11/09/2020    CR 0.70 11/09/2020    GFRESTIMATED 85 11/09/2020    GFRESTBLACK >90 11/09/2020    BENY 9.5 11/09/2020      CT coronary angiogram 7/1/2020 [facility]  IMPRESSION:  1.  Patent large proximal LAD stent with mild in-stent restenosis. LAD  is diffusely small, with mild stenosis immediately proximal and distal  to the stent. No significant CAD elsewhere.    Stress Echocardiogram 1/15/2015    No stress induced regional wall motion abnormalities.  Normal resting LV function with EF of approximately 60-65%; normal response   to exercise with increase to approximately 70-75%.  No ECG evidence of ischemia.  No subjective evidence of ischemia.  Limited for age functional capacity.  PatientHeight: 62 in  PatientWeight: 130 lbs  SystolicPressure: 122 mmHg  DiastolicPressure: 84 mmHg    Assessment and Plan:   Ms. Kassy Casper is a 74 year old  female with PMH significant for history of CAD with PCI to LAD 1998 (3.5 mm), HTN, hyperlipidemia, and uncomplicated asthma.    Patient is currently asymptomatic from cardiac standpoint.   Reports well-controlled blood pressure at home.  Recently she was in the ER for vertigo.  BMP (available in Care Everywhere shows hypokalemia at 3.2).  I think the cause of her hypokalemia is high dose of hydrochlorothiazide.  I discussed with the patient that hypokalemia might be sometimes life-threatening in the setting of CAD.  I recommended medication changes.  She is agreeable to proceed.    #Coronary artery disease  -Currently asymptomatic  -Most recent CT angiogram 7/2020 showed patent stent with mild nonobstructive coronary artery disease.  -Continue aspirin and Crestor.  LDL well controlled    #Hypertension  #Hypokalemia likely due to high dose of hydrochlorothiazide  -Reports normal blood pressure at home.  -Patient is on hydrochlorothiazide 50 mg, lisinopril 10 mg and potassium supplement 20 mEq/day.  -Recommend to decrease dose of hydrochlorothiazide from 50 to 25 mg and increase dose of lisinopril from 10 to 20 mg.  She will continue to monitor blood pressure at home.  -BMP in 2 weeks    Return to clinic 2 years or earlier as needed.    Total time spent 30 minutes including precharting, telephone visit and medical documentation.    Please donot hesitate to contact me if you have any questions or concerns. Again, thank you for allowing me to participate in the care of your patient.    Anastacio RENTERIA MD  [facility] Division of Cardiology  Pager [phone]

## 2023-02-03 DIAGNOSIS — E87.6 HYPOKALEMIA: ICD-10-CM

## 2023-02-07 RX ORDER — POTASSIUM CHLORIDE 1500 MG/1
20 TABLET, EXTENDED RELEASE ORAL DAILY
Qty: 90 TABLET | Refills: 2 | Status: SHIPPED | OUTPATIENT
Start: 2023-02-07 | End: 2023-09-25

## 2023-02-07 NOTE — TELEPHONE ENCOUNTER
Last Clinic Visit: 9-  RECENT OUTSIDE LABS AVAILABLE IN THE LAB TAB OR CARE EVERYWHERE.  Basic Metabolic Panel  Order: [id number]   Ref Range & Units 1 mo ago   Sodium 136 - 145 mmol/L 142    Potassium 3.5 - 5.1 mmol/L 3.2 Low     Chloride 98 - 109 mmol/L 104    CO2 20 - 29 mmol/L 29    Anion Gap 7 - 16 mmol/L 9    Calcium 8.4 - 10.4 mg/dL 9.9    BUN 7 - 26 mg/dL 16    Creatinine 0.55 - 1.02 mg/dL 0.65    Glucose 70 - 100 mg/dL 106 High     Comment: The given reference range is for the

## 2023-03-04 DIAGNOSIS — I10 HYPERTENSION, UNSPECIFIED TYPE: ICD-10-CM

## 2023-03-04 DIAGNOSIS — F41.9 ANXIETY: ICD-10-CM

## 2023-03-04 DIAGNOSIS — E78.5 HYPERLIPIDEMIA LDL GOAL <70: ICD-10-CM

## 2023-03-04 DIAGNOSIS — Z00.00 ROUTINE GENERAL MEDICAL EXAMINATION AT A HEALTH CARE FACILITY: ICD-10-CM

## 2023-03-08 RX ORDER — LISINOPRIL 10 MG/1
TABLET ORAL
Qty: 30 TABLET | Refills: 0 | OUTPATIENT
Start: 2023-03-08

## 2023-03-08 NOTE — TELEPHONE ENCOUNTER
lisinopril (ZESTRIL) 20 MG tablet   Last Written Prescription Date:  1/9/23  Last Fill Quantity: 90,   # refills: 3> sent in by Dr Israel/ Cardiology [facility] [id number] [place] [address]   Denied.     ALPRAZOLAM 0.5 MG TABLET  Last Written Prescription Date:  9/30/22  Last Fill Quantity: 30,   # refills: 0   Last Office Visit : 9/30/22  Future Office visit:  3/22/23     Routing refill request to provider for review/approval because:  Controlled substance

## 2023-03-09 DIAGNOSIS — E78.5 HYPERLIPIDEMIA LDL GOAL <70: ICD-10-CM

## 2023-03-09 DIAGNOSIS — I10 HYPERTENSION, UNSPECIFIED TYPE: ICD-10-CM

## 2023-03-09 DIAGNOSIS — Z00.00 ROUTINE GENERAL MEDICAL EXAMINATION AT A HEALTH CARE FACILITY: ICD-10-CM

## 2023-03-09 RX ORDER — ALPRAZOLAM 0.5 MG
TABLET ORAL
Qty: 30 TABLET | Refills: 0 | Status: SHIPPED | OUTPATIENT
Start: 2023-03-09 | End: 2023-07-20

## 2023-03-10 RX ORDER — LISINOPRIL 10 MG/1
TABLET ORAL
Qty: 30 TABLET | Refills: 0 | OUTPATIENT
Start: 2023-03-10

## 2023-03-22 ENCOUNTER — OFFICE VISIT (OUTPATIENT)
Dept: FAMILY MEDICINE | Facility: CLINIC | Age: 78
End: 2023-03-22
Payer: MEDICARE

## 2023-03-22 ENCOUNTER — LAB (OUTPATIENT)
Dept: LAB | Facility: CLINIC | Age: 78
End: 2023-03-22
Payer: MEDICARE

## 2023-03-22 ENCOUNTER — ANCILLARY PROCEDURE (OUTPATIENT)
Dept: GENERAL RADIOLOGY | Facility: CLINIC | Age: 78
End: 2023-03-22
Attending: FAMILY MEDICINE
Payer: MEDICARE

## 2023-03-22 VITALS — SYSTOLIC BLOOD PRESSURE: 153 MMHG | OXYGEN SATURATION: 99 % | HEART RATE: 86 BPM | DIASTOLIC BLOOD PRESSURE: 88 MMHG

## 2023-03-22 DIAGNOSIS — R06.09 DOE (DYSPNEA ON EXERTION): Primary | ICD-10-CM

## 2023-03-22 DIAGNOSIS — R06.09 DOE (DYSPNEA ON EXERTION): ICD-10-CM

## 2023-03-22 DIAGNOSIS — I10 HYPERTENSION, UNSPECIFIED TYPE: ICD-10-CM

## 2023-03-22 DIAGNOSIS — E87.6 HYPOKALEMIA: ICD-10-CM

## 2023-03-22 DIAGNOSIS — E78.5 HYPERLIPIDEMIA LDL GOAL <70: ICD-10-CM

## 2023-03-22 LAB
ALBUMIN SERPL BCG-MCNC: 4.8 G/DL (ref 3.5–5.2)
ALP SERPL-CCNC: 101 U/L (ref 35–104)
ALT SERPL W P-5'-P-CCNC: 30 U/L (ref 10–35)
ANION GAP SERPL CALCULATED.3IONS-SCNC: 8 MMOL/L (ref 7–15)
AST SERPL W P-5'-P-CCNC: 30 U/L (ref 10–35)
BASOPHILS # BLD AUTO: 0 10E3/UL (ref 0–0.2)
BASOPHILS NFR BLD AUTO: 1 %
BILIRUB SERPL-MCNC: 0.3 MG/DL
BUN SERPL-MCNC: 13.5 MG/DL (ref 8–23)
CALCIUM SERPL-MCNC: 10.2 MG/DL (ref 8.8–10.2)
CHLORIDE SERPL-SCNC: 103 MMOL/L (ref 98–107)
CHOLEST SERPL-MCNC: 161 MG/DL
CREAT SERPL-MCNC: 0.72 MG/DL (ref 0.51–0.95)
DEPRECATED HCO3 PLAS-SCNC: 32 MMOL/L (ref 22–29)
EOSINOPHIL # BLD AUTO: 0.2 10E3/UL (ref 0–0.7)
EOSINOPHIL NFR BLD AUTO: 4 %
ERYTHROCYTE [DISTWIDTH] IN BLOOD BY AUTOMATED COUNT: 12.2 % (ref 10–15)
GFR SERPL CREATININE-BSD FRML MDRD: 86 ML/MIN/1.73M2
GLUCOSE SERPL-MCNC: 96 MG/DL (ref 70–99)
HCT VFR BLD AUTO: 44.2 % (ref 35–47)
HDLC SERPL-MCNC: 70 MG/DL
HGB BLD-MCNC: 14.4 G/DL (ref 11.7–15.7)
IMM GRANULOCYTES # BLD: 0 10E3/UL
IMM GRANULOCYTES NFR BLD: 0 %
LDLC SERPL CALC-MCNC: 59 MG/DL
LYMPHOCYTES # BLD AUTO: 1.4 10E3/UL (ref 0.8–5.3)
LYMPHOCYTES NFR BLD AUTO: 27 %
MCH RBC QN AUTO: 29.3 PG (ref 26.5–33)
MCHC RBC AUTO-ENTMCNC: 32.6 G/DL (ref 31.5–36.5)
MCV RBC AUTO: 90 FL (ref 78–100)
MONOCYTES # BLD AUTO: 0.4 10E3/UL (ref 0–1.3)
MONOCYTES NFR BLD AUTO: 8 %
NEUTROPHILS # BLD AUTO: 3 10E3/UL (ref 1.6–8.3)
NEUTROPHILS NFR BLD AUTO: 60 %
NONHDLC SERPL-MCNC: 91 MG/DL
NRBC # BLD AUTO: 0 10E3/UL
NRBC BLD AUTO-RTO: 0 /100
NT-PROBNP SERPL-MCNC: 55 PG/ML (ref 0–1800)
PLATELET # BLD AUTO: 234 10E3/UL (ref 150–450)
POTASSIUM SERPL-SCNC: 3.8 MMOL/L (ref 3.4–5.3)
PROT SERPL-MCNC: 7.8 G/DL (ref 6.4–8.3)
RBC # BLD AUTO: 4.92 10E6/UL (ref 3.8–5.2)
SODIUM SERPL-SCNC: 143 MMOL/L (ref 136–145)
TRIGL SERPL-MCNC: 158 MG/DL
TROPONIN T SERPL HS-MCNC: <6 NG/L
WBC # BLD AUTO: 5 10E3/UL (ref 4–11)

## 2023-03-22 PROCEDURE — 83880 ASSAY OF NATRIURETIC PEPTIDE: CPT | Performed by: PATHOLOGY

## 2023-03-22 PROCEDURE — 85025 COMPLETE CBC W/AUTO DIFF WBC: CPT | Performed by: PATHOLOGY

## 2023-03-22 PROCEDURE — 80061 LIPID PANEL: CPT | Performed by: PATHOLOGY

## 2023-03-22 PROCEDURE — 36415 COLL VENOUS BLD VENIPUNCTURE: CPT | Performed by: PATHOLOGY

## 2023-03-22 PROCEDURE — 99214 OFFICE O/P EST MOD 30 MIN: CPT | Mod: 25 | Performed by: FAMILY MEDICINE

## 2023-03-22 PROCEDURE — 93000 ELECTROCARDIOGRAM COMPLETE: CPT | Performed by: FAMILY MEDICINE

## 2023-03-22 PROCEDURE — 71046 X-RAY EXAM CHEST 2 VIEWS: CPT | Mod: GC | Performed by: RADIOLOGY

## 2023-03-22 PROCEDURE — 84484 ASSAY OF TROPONIN QUANT: CPT | Performed by: PATHOLOGY

## 2023-03-22 PROCEDURE — 80053 COMPREHEN METABOLIC PANEL: CPT | Performed by: PATHOLOGY

## 2023-03-22 ASSESSMENT — ENCOUNTER SYMPTOMS
FEVER: 0
WEIGHT GAIN: 0
ALTERED TEMPERATURE REGULATION: 0
SYNCOPE: 0
HYPERTENSION: 1
LEG PAIN: 0
CHILLS: 0
HYPOTENSION: 0
DECREASED APPETITE: 0
EYE IRRITATION: 1
LIGHT-HEADEDNESS: 0
ORTHOPNEA: 0
INCREASED ENERGY: 0
EYE WATERING: 0
POLYPHAGIA: 0
EYE REDNESS: 0
FATIGUE: 1
HALLUCINATIONS: 0
EXERCISE INTOLERANCE: 0
PALPITATIONS: 0
WEIGHT LOSS: 0
DOUBLE VISION: 0
POLYDIPSIA: 0
NIGHT SWEATS: 0
SLEEP DISTURBANCES DUE TO BREATHING: 0
EYE PAIN: 0

## 2023-03-22 ASSESSMENT — ACTIVITIES OF DAILY LIVING (ADL)
IN_THE_PAST_7_DAYS,_DID_YOU_NEED_HELP_FROM_OTHERS_TO_TAKE_CARE_OF_THINGS_SUCH_AS_LAUNDRY_AND_HOUSEKEEPING,_BANKING,_SHOPPING,_USING_THE_TELEPHONE,_FOOD_PREPARATION,_TRANSPORTATION,_OR_TAKING_YOUR_OWN_MEDICATIONS?: N
IN_THE_PAST_7_DAYS,_DID_YOU_NEED_HELP_FROM_OTHERS_TO_PERFORM_EVERYDAY_ACTIVITIES_SUCH_AS_EATING,_GETTING_DRESSED,_GROOMING,_BATHING,_WALKING,_OR_USING_THE_TOILET: N

## 2023-03-22 NOTE — PROGRESS NOTES
"  Assessment & Plan     STEELE (dyspnea on exertion)  Originally a physical, due to sx we agree to do STEELE visit. If neg and sx persist, consider pft, discssed   - EKG 12-lead complete w/read - Clinics  - CBC with platelets and differential; Future  - Comprehensive metabolic panel (BMP + Alb, Alk Phos, ALT, AST, Total. Bili, TP); Future  - Troponin T, High Sensitivity; Future  - XR Chest 2 Views; Future  - BNP-N terminal pro; Future  - Echocardiogram Exercise Stress; Future    35 minutes spent on the date of the encounter doing chart review, history and exam, documentation and further activities per the note not including ekg read time; ekg wnl except for incomplete lbbb    BMI:   Estimated body mass index is 27.87 kg/m  as calculated from the following:    Height as of 9/30/22: 1.549 m (5' 1\").    Weight as of 9/30/22: 66.9 kg (147 lb 8 oz).     No follow-ups on file.    Krunal Oconnell MD  [facility] [place]    Nicolette Elena is a 77 year old, presenting for the following health issues:  Physical (Pt experiencing shortness of breath persisting 3-4 months ago)  No flowsheet data found.  HPI Recent steele  No cough fever chest pain nausea sweats  Had cardiac stent 25 years ago, similar sx then  Strong FH heart disease, tries to eat healthy exercise and manage cv risks  Past Medical History:   Diagnosis Date     Benign neoplasm of choroid     LE     CAD (coronary artery disease)      Coronary atherosclerosis of unspecified type of vessel, native or graft     s/p stint 1998     Disorder of bone and cartilage, unspecified     osteopenia on DEXA 2005     Hyperlipidemia LDL goal < 70      Hypertension      Insomnia, unspecified      Other and unspecified hyperlipidemia      PVD (posterior vitreous detachment), right eye      Uncomplicated asthma      Unspecified essential hypertension      Past Surgical History:   Procedure Laterality Date     ANGIOPLASTY  1998     COLONOSCOPY  2006 "     Current Outpatient Medications   Medication     ALPRAZolam (XANAX) 0.5 MG tablet     aspirin 81 MG tablet     atorvastatin (LIPITOR) 40 MG tablet     EPINEPHrine (ANY BX GENERIC EQUIV) 0.3 MG/0.3ML injection 2-pack     hydrochlorothiazide (HYDRODIURIL) 25 MG tablet     lisinopril (ZESTRIL) 20 MG tablet     Multiple Vitamin (MULTIVITAMIN  S) CAPS     potassium chloride ER (K-TAB) 20 MEQ CR tablet     traZODone (DESYREL) 100 MG tablet     valACYclovir (VALTREX) 500 MG tablet     No current facility-administered medications for this visit.     Allergies   Allergen Reactions     Latex Other (See Comments) and Hives     Local skin reaction  Other reaction(s): Swelling, lips/tongue     Penicillins Other (See Comments) and Hives     Asthma/Allergies a child so was told not to take    Other reaction(s): Swelling, lips/tongue     Minocycline Hcl Swelling     On asa, statin, bp meds        Review of Systems   Can walk on level surface, gets steele on incline; h/o asthma, no wheeze cough, no sx in bed at night    Answers for HPI/ROS submitted by the patient on 3/22/2023  General Symptoms: Yes  Skin Symptoms: No  HENT Symptoms: No  EYE SYMPTOMS: Yes  HEART SYMPTOMS: Yes  LUNG SYMPTOMS: No  INTESTINAL SYMPTOMS: No  URINARY SYMPTOMS: No  GYNECOLOGIC SYMPTOMS: No  BREAST SYMPTOMS: No  SKELETAL SYMPTOMS: No  BLOOD SYMPTOMS: No  NERVOUS SYSTEM SYMPTOMS: No  MENTAL HEALTH SYMPTOMS: No  Fever: No  Loss of appetite: No  Weight loss: No  Weight gain: No  Fatigue: Yes  Night sweats: No  Chills: No  Increased stress: No  Excessive hunger: No  Excessive thirst: No  Feeling hot or cold when others believe the temperature is normal: No  Loss of height: No  Post-operative complications: No  Surgical site pain: No  Hallucinations: No  Change in or Loss of Energy: No  Hyperactivity: No  Confusion: No  Eye pain: No  Vision loss: No  Dry eyes: Yes  Watery eyes: No  Eye bulging: No  Double vision: No  Flashing of lights: No  Spots: No  Floaters:  No  Redness: No  Crossed eyes: No  Tunnel Vision: No  Yellowing of eyes: No  Eye irritation: Yes  Chest pain or pressure: Yes  Fast or irregular heartbeat: No  Pain in legs with walking: No  Trouble breathing while lying down: No  Fingers or toes appear blue: No  High blood pressure: Yes  Low blood pressure: No  Fainting: No  Murmurs: No  Pacemaker: No  Varicose veins: No  Edema or swelling: No  Wake up at night with shortness of breath: No  Light-headedness: No  Exercise intolerance: No      Objective    BP (!) 153/88 (BP Location: Right arm, Patient Position: Sitting, Cuff Size: Adult Regular)   Pulse 86   SpO2 99%   There is no height or weight on file to calculate BMI.  Physical Exam   GENERAL: healthy, alert and no distress  NECK: no adenopathy, no asymmetry, masses, or scars and thyroid normal to palpation  RESP: lungs clear to auscultation - no rales, rhonchi or wheezes  CV: regular rate and rhythm, normal S1 S2, no S3 or S4, no murmur, click or rub, no peripheral edema and peripheral pulses strong  ABDOMEN: soft, nontender, no hepatosplenomegaly, no masses and bowel sounds normal  MS: no gross musculoskeletal defects noted, no edema

## 2023-03-22 NOTE — NURSING NOTE
Kassy Casper is a 77 year old female that presents in clinic today for the following:     Chief Complaint   Patient presents with     Physical     Pt experiencing shortness of breath persisting 3-4 months ago       The patient's allergies and medications were reviewed. The patient's vitals were obtained without incident. The patient does not have any other questions for the provider.     Vita Boland, EMT at 3:00 PM on 3/22/2023.  Primary Care Clinic: [phone]

## 2023-03-23 LAB
ATRIAL RATE - MUSE: 70 BPM
DIASTOLIC BLOOD PRESSURE - MUSE: NORMAL MMHG
INTERPRETATION ECG - MUSE: NORMAL
P AXIS - MUSE: 66 DEGREES
PR INTERVAL - MUSE: 200 MS
QRS DURATION - MUSE: 114 MS
QT - MUSE: 400 MS
QTC - MUSE: 432 MS
R AXIS - MUSE: 11 DEGREES
SYSTOLIC BLOOD PRESSURE - MUSE: NORMAL MMHG
T AXIS - MUSE: 58 DEGREES
VENTRICULAR RATE- MUSE: 70 BPM

## 2023-07-15 ENCOUNTER — HEALTH MAINTENANCE LETTER (OUTPATIENT)
Age: 78
End: 2023-07-15

## 2023-07-19 DIAGNOSIS — F41.9 ANXIETY: ICD-10-CM

## 2023-07-20 RX ORDER — ALPRAZOLAM 0.5 MG
0.5 TABLET ORAL
Qty: 30 TABLET | Refills: 0 | Status: SHIPPED | OUTPATIENT
Start: 2023-07-20 | End: 2023-12-15

## 2023-07-20 NOTE — TELEPHONE ENCOUNTER
ALPRAZolam (XANAX) 0.5 MG tablet 30 tablet 0 3/9/2023         Last Written Prescription Date:  3-9-2023  Last Fill Quantity: 30,   # refills: 0  Last Office Visit : 3-  Future Office visit:  none    Routing refill request to provider for review/approval because:  CONTROLLED MEDICATION      Kathleen M Doege RN

## 2023-09-22 DIAGNOSIS — E87.6 HYPOKALEMIA: ICD-10-CM

## 2023-09-25 RX ORDER — POTASSIUM CHLORIDE 1500 MG/1
20 TABLET, EXTENDED RELEASE ORAL DAILY
Qty: 90 TABLET | Refills: 1 | Status: SHIPPED | OUTPATIENT
Start: 2023-09-25 | End: 2024-06-24

## 2023-09-25 NOTE — TELEPHONE ENCOUNTER
potassium chloride ER (K-TAB) 20 MEQ CR tablet 90 tablet 2 2/7/2023     Last Office Visit : 3/22/23  Future Office visit:  none on file

## 2023-11-17 DIAGNOSIS — B00.1 RECURRENT COLD SORES: ICD-10-CM

## 2023-11-17 RX ORDER — VALACYCLOVIR HYDROCHLORIDE 500 MG/1
500 TABLET, FILM COATED ORAL DAILY
Qty: 30 TABLET | Refills: 2 | Status: SHIPPED | OUTPATIENT
Start: 2023-11-17 | End: 2024-05-03

## 2023-11-18 NOTE — TELEPHONE ENCOUNTER
valACYclovir (VALTREX) 500 MG tablet 30 tablet 11 10/27/2022  Last Office Visit : 3/22/2023   Future Office visit:  0    Creatinine   Date Value Ref Range Status   03/22/2023 0.72 0.51 - 0.95 mg/dL Final   11/09/2020 0.70 0.52 - 1.04 mg/dL Final

## 2023-12-09 DIAGNOSIS — E78.5 HYPERLIPIDEMIA LDL GOAL <70: ICD-10-CM

## 2023-12-09 DIAGNOSIS — I10 ESSENTIAL HYPERTENSION WITH GOAL BLOOD PRESSURE LESS THAN 140/90: Primary | ICD-10-CM

## 2023-12-14 RX ORDER — HYDROCHLOROTHIAZIDE 25 MG/1
25 TABLET ORAL DAILY
Qty: 90 TABLET | Refills: 3 | Status: SHIPPED | OUTPATIENT
Start: 2023-12-14 | End: 2024-07-10

## 2023-12-14 NOTE — TELEPHONE ENCOUNTER
Last Clinic Visit: [facility] 1/9/23  Return to clinic in 2 years  BMP checked within 2 weeks of dose change on 1/9/23, lab results reviewed, WNL

## 2023-12-15 DIAGNOSIS — F41.9 ANXIETY: ICD-10-CM

## 2023-12-15 RX ORDER — ALPRAZOLAM 0.5 MG
0.5 TABLET ORAL
Qty: 30 TABLET | Refills: 0 | Status: SHIPPED | OUTPATIENT
Start: 2023-12-15 | End: 2024-04-17

## 2023-12-15 NOTE — TELEPHONE ENCOUNTER
ALPRAZOLAM 0.5 MG TABLET       Last Written Prescription Date:  7-20-23  Last Fill Quantity: 30,   # refills: 0  Last Office Visit : 3-22-23  Future Office visit:  none    Routing refill request to provider for review/approval because:  Drug not on the [facility], [facility] or [facility] refill protocol or controlled substance

## 2023-12-20 DIAGNOSIS — I25.10 CORONARY ARTERY DISEASE INVOLVING NATIVE CORONARY ARTERY OF NATIVE HEART WITHOUT ANGINA PECTORIS: ICD-10-CM

## 2023-12-22 RX ORDER — ATORVASTATIN CALCIUM 40 MG/1
40 TABLET, FILM COATED ORAL DAILY
Qty: 90 TABLET | Refills: 0 | Status: SHIPPED | OUTPATIENT
Start: 2023-12-22 | End: 2024-04-04

## 2024-01-08 DIAGNOSIS — G47.00 INSOMNIA, UNSPECIFIED TYPE: ICD-10-CM

## 2024-01-11 RX ORDER — TRAZODONE HYDROCHLORIDE 100 MG/1
100 TABLET ORAL
Qty: 30 TABLET | Refills: 3 | Status: SHIPPED | OUTPATIENT
Start: 2024-01-11

## 2024-02-10 ENCOUNTER — HEALTH MAINTENANCE LETTER (OUTPATIENT)
Age: 79
End: 2024-02-10

## 2024-03-18 ENCOUNTER — HOSPITAL ENCOUNTER (OUTPATIENT)
Dept: CARDIOLOGY | Facility: CLINIC | Age: 79
Discharge: HOME | End: 2024-03-18
Attending: FAMILY MEDICINE | Admitting: FAMILY MEDICINE
Payer: MEDICARE

## 2024-03-18 ENCOUNTER — ANCILLARY PROCEDURE (OUTPATIENT)
Dept: MAMMOGRAPHY | Facility: CLINIC | Age: 79
End: 2024-03-18
Attending: FAMILY MEDICINE
Payer: MEDICARE

## 2024-03-18 DIAGNOSIS — Z12.31 VISIT FOR SCREENING MAMMOGRAM: ICD-10-CM

## 2024-03-18 DIAGNOSIS — R06.09 DOE (DYSPNEA ON EXERTION): ICD-10-CM

## 2024-03-18 PROCEDURE — 77063 BREAST TOMOSYNTHESIS BI: CPT | Performed by: RADIOLOGY

## 2024-03-18 PROCEDURE — 93350 STRESS TTE ONLY: CPT | Mod: 26 | Performed by: INTERNAL MEDICINE

## 2024-03-18 PROCEDURE — 255N000002 HC RX 255 OP 636: Performed by: INTERNAL MEDICINE

## 2024-03-18 PROCEDURE — 93016 CV STRESS TEST SUPVJ ONLY: CPT | Performed by: INTERNAL MEDICINE

## 2024-03-18 PROCEDURE — 77067 SCR MAMMO BI INCL CAD: CPT | Performed by: RADIOLOGY

## 2024-03-18 PROCEDURE — 93321 DOPPLER ECHO F-UP/LMTD STD: CPT | Mod: 26 | Performed by: INTERNAL MEDICINE

## 2024-03-18 PROCEDURE — 999N000208 ECHO STRESS ECHOCARDIOGRAM

## 2024-03-18 PROCEDURE — 93018 CV STRESS TEST I&R ONLY: CPT | Performed by: INTERNAL MEDICINE

## 2024-03-18 PROCEDURE — 93325 DOPPLER ECHO COLOR FLOW MAPG: CPT | Mod: 26 | Performed by: INTERNAL MEDICINE

## 2024-03-18 RX ADMIN — PERFLUTREN 5 ML: 6.52 INJECTION, SUSPENSION INTRAVENOUS at 12:55

## 2024-03-29 DIAGNOSIS — I25.10 CORONARY ARTERY DISEASE INVOLVING NATIVE CORONARY ARTERY OF NATIVE HEART WITHOUT ANGINA PECTORIS: ICD-10-CM

## 2024-04-04 RX ORDER — ATORVASTATIN CALCIUM 40 MG/1
40 TABLET, FILM COATED ORAL DAILY
Qty: 90 TABLET | Refills: 0 | Status: SHIPPED | OUTPATIENT
Start: 2024-04-04 | End: 2024-07-10

## 2024-04-07 DIAGNOSIS — I10 HYPERTENSION, UNSPECIFIED TYPE: ICD-10-CM

## 2024-04-15 RX ORDER — LISINOPRIL 20 MG/1
20 TABLET ORAL DAILY
Qty: 90 TABLET | Refills: 0 | Status: SHIPPED | OUTPATIENT
Start: 2024-04-15 | End: 2024-07-10

## 2024-04-15 NOTE — TELEPHONE ENCOUNTER
lisinopril (ZESTRIL) 20 MG tablet 90 tablet 3 1/9/2023 - --  Sig - Route: Take 1 tablet (20 mg) by mouth daily - Oral  ----------------------  Last Office Visit : 3/22/2023  [facility] Krunal Monroe MD     Future Office visit:  0  ----------------------    Routing refill request to provider for review/approval because:  Overdue for annual office visit and BP recheck, BMP recheck. 90 days dany given with reminder to schedule appt.    BP Readings from Last 3 Encounters:   03/22/23 (!) 153/88   09/30/22 139/84   05/25/21 (!) 145/83     Last Comprehensive Metabolic Panel:  Lab Results   Component Value Date     03/22/2023    POTASSIUM 3.8 03/22/2023    CHLORIDE 103 03/22/2023    CO2 32 (H) 03/22/2023    ANIONGAP 8 03/22/2023    GLC 96 03/22/2023    BUN 13.5 03/22/2023    CR 0.72 03/22/2023    GFRESTIMATED 86 03/22/2023    BENY 10.2 03/22/2023           Pass/Fail Protocol Criteria:  ACE Inhibitors (Including Combos) Protocol Skpfgc42/15/2024 04:06 PM   Protocol Details Blood pressure under 140/90 in past 12 months- Clinicial or Patient Reported    Has GFR on file in past 12 months and most recent value is normal    Recent (12 mo) or future (90 days) visit within the authorizing provider's specialty    Normal serum creatinine on file in past 12 months    Normal serum potassium on file in past 12 months    Medication is active on med list    Medication indicated for associated diagnosis    Patient is age 18 or older    No active pregnancy on record    No positive pregnancy test within past 12 months

## 2024-04-16 DIAGNOSIS — F41.9 ANXIETY: ICD-10-CM

## 2024-04-16 NOTE — TELEPHONE ENCOUNTER
ALPRAZolam (XANAX) 0.5 MG tablet 30 tablet 0 12/15/2023 -- No   Sig - Route: TAKE 1 TABLET BY MOUTH NIGHTLY AS NEEDED FOR ANXIETY. - Oral     ----------------------  Last Office Visit : 3/22/2023   Future Office visit:  0  ----------------------      Routing refill request to provider for review/approval because:  CONTROLLED MEDICATION

## 2024-04-17 RX ORDER — ALPRAZOLAM 0.5 MG
0.5 TABLET ORAL
Qty: 30 TABLET | Refills: 0 | Status: SHIPPED | OUTPATIENT
Start: 2024-04-17 | End: 2024-07-25

## 2024-04-26 DIAGNOSIS — B00.1 RECURRENT COLD SORES: ICD-10-CM

## 2024-05-03 RX ORDER — VALACYCLOVIR HYDROCHLORIDE 500 MG/1
500 TABLET, FILM COATED ORAL DAILY
Qty: 30 TABLET | Refills: 2 | Status: SHIPPED | OUTPATIENT
Start: 2024-05-03

## 2024-05-03 NOTE — TELEPHONE ENCOUNTER
LVD:  3/22/2023  [facility] Krunal Monroe MD  Family Medicine   90 day dany refill sent to the pharmacy - including instructions for patient to call the clinic and schedule an appointment.  Refilled per protocol.

## 2024-06-16 DIAGNOSIS — E87.6 HYPOKALEMIA: ICD-10-CM

## 2024-06-24 RX ORDER — POTASSIUM CHLORIDE 1500 MG/1
20 TABLET, EXTENDED RELEASE ORAL DAILY
Qty: 30 TABLET | Refills: 0 | Status: SHIPPED | OUTPATIENT
Start: 2024-06-24 | End: 2024-07-10

## 2024-06-24 NOTE — TELEPHONE ENCOUNTER
potassium chloride ER (K-TAB) 20 MEQ CR tablet 90 tablet 1 9/25/2023 -- No   Sig - Route: Take 1 tablet (20 mEq) by mouth daily Patient needs to have labs for further refills. - Oral     ----------------------  Last Office Visit : 3/22/2023  [facility] Kevin Oconnell    Future Office visit:  none  ----------------------      Routing refill request to provider for review/approval because:    Overdue for annual follow up/no appointment scheduled.  Overdue for K recheck    Last Comprehensive Metabolic Panel:  Lab Results   Component Value Date     03/22/2023    POTASSIUM 3.8 03/22/2023    CHLORIDE 103 03/22/2023    CO2 32 (H) 03/22/2023    ANIONGAP 8 03/22/2023    GLC 96 03/22/2023    BUN 13.5 03/22/2023    CR 0.72 03/22/2023    GFRESTIMATED 86 03/22/2023    BENY 10.2 03/22/2023       Potassium Supplements Protocol [id number]/24/2024 09:35 AM   Protocol Details Recent (12 mo) or future (90 days) visit within the authorizing provider's department    Normal serum potassium in past 12 months

## 2024-07-07 DIAGNOSIS — I25.10 CORONARY ARTERY DISEASE INVOLVING NATIVE CORONARY ARTERY OF NATIVE HEART WITHOUT ANGINA PECTORIS: ICD-10-CM

## 2024-07-08 DIAGNOSIS — I10 ESSENTIAL HYPERTENSION WITH GOAL BLOOD PRESSURE LESS THAN 140/90: ICD-10-CM

## 2024-07-08 DIAGNOSIS — I10 HYPERTENSION, UNSPECIFIED TYPE: ICD-10-CM

## 2024-07-08 DIAGNOSIS — E87.6 HYPOKALEMIA: ICD-10-CM

## 2024-07-08 DIAGNOSIS — E78.5 HYPERLIPIDEMIA LDL GOAL <70: ICD-10-CM

## 2024-07-10 RX ORDER — LISINOPRIL 20 MG/1
20 TABLET ORAL DAILY
Qty: 30 TABLET | Refills: 0 | Status: SHIPPED | OUTPATIENT
Start: 2024-07-10 | End: 2024-08-19

## 2024-07-10 RX ORDER — POTASSIUM CHLORIDE 1500 MG/1
20 TABLET, EXTENDED RELEASE ORAL DAILY
Qty: 30 TABLET | Refills: 0 | Status: SHIPPED | OUTPATIENT
Start: 2024-07-10 | End: 2024-09-11

## 2024-07-10 RX ORDER — HYDROCHLOROTHIAZIDE 25 MG/1
25 TABLET ORAL DAILY
Qty: 90 TABLET | Refills: 3 | Status: SHIPPED | OUTPATIENT
Start: 2024-07-10

## 2024-07-10 RX ORDER — ATORVASTATIN CALCIUM 40 MG/1
40 TABLET, FILM COATED ORAL DAILY
Qty: 30 TABLET | Refills: 0 | Status: SHIPPED | OUTPATIENT
Start: 2024-07-10 | End: 2024-08-19

## 2024-07-23 DIAGNOSIS — F41.9 ANXIETY: ICD-10-CM

## 2024-07-24 NOTE — TELEPHONE ENCOUNTER
ALPRAZOLAM 0.5 MG TABLET       TAKE 1 TABLET BY MOUTH NIGHTLY AS NEEDED FOR ANXIETY PATIENT NEEDS TO SEE PRIMARY PROVIDER FOR FURTHER REFILLS.   PLEASE CALL FOR AN APPOINTMENT -[phone].  Last Written Prescription Date:  4-17-24  Last Fill Quantity: 30,   # refills: 0  Last Office Visit : 3-22-23  Future Office visit:  NONE    Routing refill request to provider for review/approval because:  Drug not on the [facility] or [facility] refill protocol or controlled substance    PAST DUE APPT, PREVIOUS TIFF RF

## 2024-07-25 RX ORDER — ALPRAZOLAM 0.5 MG
0.5 TABLET ORAL
Qty: 15 TABLET | Refills: 0 | Status: SHIPPED | OUTPATIENT
Start: 2024-07-25

## 2024-08-14 DIAGNOSIS — I10 HYPERTENSION, UNSPECIFIED TYPE: ICD-10-CM

## 2024-08-14 DIAGNOSIS — I25.10 CORONARY ARTERY DISEASE INVOLVING NATIVE CORONARY ARTERY OF NATIVE HEART WITHOUT ANGINA PECTORIS: ICD-10-CM

## 2024-08-16 NOTE — TELEPHONE ENCOUNTER
atorvastatin (LIPITOR) 40 MG tablet       Last Written Prescription Date:  7/10/24  Last Fill Quantity: 30,   # refills: 0  Last Office Visit : 3/22/23  Future Office visit:  None    Routing refill request to provider for review/approval because:  Antihyperlipidemic agents [id number]/14/2024 02:10 AM   Protocol Details LDL on file in the past 12 months    Recent (12 mo) or future (90 days) visit within the authorizing provider's specialty   LDL 3/22/23    Sheela Refill given already  lisinopril (ZESTRIL) 20 MG tablet       Last Written Prescription Date:  7/10/24  Last Fill Quantity: 30,   # refills: 0  Last Office Visit : 3/22/23  Future Office visit:  None    Routing refill request to provider for review/approval because:  ACE Inhibitors (Including Combos) Protocol [id number]/14/2024 02:10 AM   Protocol Details Blood pressure under 140/90 in past 12 months- Clinicial or Patient Reported    Has GFR on file in past 12 months and most recent value is normal    Recent (12 mo) or future (90 days) visit within the authorizing provider's specialty    Normal serum potassium on file in past 12 months   GFR, K+ 3/22/23  BP Readings from Last 3 Encounters:   03/22/23 (!) 153/88   09/30/22 139/84   05/25/21 (!) 145/83   Sheela Refill Given Already    Eunice GUADARRAMA RN  [facility]/Red Flag Triage & Med Refill Team

## 2024-08-19 RX ORDER — LISINOPRIL 20 MG/1
20 TABLET ORAL DAILY
Qty: 15 TABLET | Refills: 0 | Status: SHIPPED | OUTPATIENT
Start: 2024-08-19

## 2024-08-19 RX ORDER — ATORVASTATIN CALCIUM 40 MG/1
40 TABLET, FILM COATED ORAL DAILY
Qty: 15 TABLET | Refills: 0 | Status: SHIPPED | OUTPATIENT
Start: 2024-08-19

## 2024-09-07 ENCOUNTER — HEALTH MAINTENANCE LETTER (OUTPATIENT)
Age: 79
End: 2024-09-07

## 2024-09-07 DIAGNOSIS — E87.6 HYPOKALEMIA: ICD-10-CM

## 2024-09-11 RX ORDER — POTASSIUM CHLORIDE 1500 MG/1
20 TABLET, EXTENDED RELEASE ORAL DAILY
Qty: 15 TABLET | Refills: 0 | Status: SHIPPED | OUTPATIENT
Start: 2024-09-11

## 2024-09-13 ENCOUNTER — TELEPHONE (OUTPATIENT)
Dept: CARDIOLOGY | Facility: CLINIC | Age: 79
End: 2024-09-13
Payer: MEDICARE

## 2024-09-13 NOTE — TELEPHONE ENCOUNTER
Left Voicemail (1st Attempt) for the patient to call back and schedule the following:    Appointment type: rtn cardio   Provider: can   Return date: 01/05/25  Specialty phone number: [phone] opt 1   Additional appointment(s) needed: n/a   Additonal Notes: n/a

## 2024-09-17 NOTE — TELEPHONE ENCOUNTER
Left Voicemail (2 nd Attempt) for the patient to call back and schedule the following:     Appointment type: rtn cardio   Provider: can   Return date: 01/05/25  Specialty phone number: [phone] opt 1   Additional appointment(s) needed: n/a   Additonal Notes: n/a

## 2025-04-20 ENCOUNTER — HEALTH MAINTENANCE LETTER (OUTPATIENT)
Age: 80
End: 2025-04-20

## 2025-06-01 ENCOUNTER — HEALTH MAINTENANCE LETTER (OUTPATIENT)
Age: 80
End: 2025-06-01

## 2025-08-05 DIAGNOSIS — F41.9 ANXIETY: ICD-10-CM

## 2025-08-07 RX ORDER — ALPRAZOLAM 0.5 MG
0.5 TABLET ORAL
Qty: 15 TABLET | Refills: 0 | Status: SHIPPED | OUTPATIENT
Start: 2025-08-07

## (undated) RX ORDER — METOPROLOL TARTRATE 50 MG
TABLET ORAL
Status: DISPENSED
Start: 2020-07-01

## (undated) RX ORDER — NITROGLYCERIN 0.4 MG/1
TABLET SUBLINGUAL
Status: DISPENSED
Start: 2020-07-01